# Patient Record
Sex: FEMALE | Race: BLACK OR AFRICAN AMERICAN | Employment: UNEMPLOYED | URBAN - METROPOLITAN AREA
[De-identification: names, ages, dates, MRNs, and addresses within clinical notes are randomized per-mention and may not be internally consistent; named-entity substitution may affect disease eponyms.]

---
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
## 2020-08-22 ENCOUNTER — HOSPITAL ENCOUNTER (EMERGENCY)
Age: 20
Discharge: HOME | End: 2020-08-22
Attending: EMERGENCY MEDICINE | Admitting: EMERGENCY MEDICINE
Payer: MEDICAID

## 2020-08-22 ENCOUNTER — APPOINTMENT (OUTPATIENT)
Dept: GENERAL RADIOLOGY | Age: 20
End: 2020-08-22
Attending: NURSE PRACTITIONER
Payer: MEDICAID

## 2020-08-22 VITALS
HEART RATE: 75 BPM | BODY MASS INDEX: 24.63 KG/M2 | WEIGHT: 139 LBS | OXYGEN SATURATION: 100 % | SYSTOLIC BLOOD PRESSURE: 120 MMHG | HEIGHT: 63 IN | DIASTOLIC BLOOD PRESSURE: 70 MMHG | TEMPERATURE: 99.4 F | RESPIRATION RATE: 18 BRPM

## 2020-08-22 DIAGNOSIS — J20.9 ACUTE BRONCHITIS, UNSPECIFIED ORGANISM: Primary | ICD-10-CM

## 2020-08-22 LAB
DEPRECATED S PYO AG THROAT QL EIA: NEGATIVE
HCG UR QL: NEGATIVE

## 2020-08-22 PROCEDURE — 71045 X-RAY EXAM CHEST 1 VIEW: CPT

## 2020-08-22 PROCEDURE — 99283 EMERGENCY DEPT VISIT LOW MDM: CPT

## 2020-08-22 PROCEDURE — 87880 STREP A ASSAY W/OPTIC: CPT

## 2020-08-22 PROCEDURE — 87070 CULTURE OTHR SPECIMN AEROBIC: CPT

## 2020-08-22 PROCEDURE — 81025 URINE PREGNANCY TEST: CPT

## 2020-08-22 RX ORDER — ALBUTEROL SULFATE 90 UG/1
2 AEROSOL, METERED RESPIRATORY (INHALATION)
Qty: 1 INHALER | Refills: 0 | Status: SHIPPED | OUTPATIENT
Start: 2020-08-22 | End: 2021-09-28

## 2020-08-22 RX ORDER — PREDNISONE 5 MG/1
TABLET ORAL
Qty: 21 TAB | Refills: 0 | Status: SHIPPED | OUTPATIENT
Start: 2020-08-22 | End: 2021-08-11

## 2020-08-22 RX ORDER — BENZONATATE 100 MG/1
100 CAPSULE ORAL
Qty: 30 CAP | Refills: 0 | Status: SHIPPED | OUTPATIENT
Start: 2020-08-22 | End: 2020-08-29

## 2020-08-22 NOTE — DISCHARGE INSTRUCTIONS
Patient Education        Bronchitis: Care Instructions  Your Care Instructions     Bronchitis is inflammation of the bronchial tubes, which carry air to the lungs. The tubes swell and produce mucus, or phlegm. The mucus and inflamed bronchial tubes make you cough. You may have trouble breathing. Most cases of bronchitis are caused by viruses like those that cause colds. Antibiotics usually do not help and they may be harmful. Bronchitis usually develops rapidly and lasts about 2 to 3 weeks in otherwise healthy people. Follow-up care is a key part of your treatment and safety. Be sure to make and go to all appointments, and call your doctor if you are having problems. It's also a good idea to know your test results and keep a list of the medicines you take. How can you care for yourself at home? · Take all medicines exactly as prescribed. Call your doctor if you think you are having a problem with your medicine. · Get some extra rest.  · Take an over-the-counter pain medicine, such as acetaminophen (Tylenol), ibuprofen (Advil, Motrin), or naproxen (Aleve) to reduce fever and relieve body aches. Read and follow all instructions on the label. · Do not take two or more pain medicines at the same time unless the doctor told you to. Many pain medicines have acetaminophen, which is Tylenol. Too much acetaminophen (Tylenol) can be harmful. · Take an over-the-counter cough medicine that contains dextromethorphan to help quiet a dry, hacking cough so that you can sleep. Avoid cough medicines that have more than one active ingredient. Read and follow all instructions on the label. · Breathe moist air from a humidifier, hot shower, or sink filled with hot water. The heat and moisture will thin mucus so you can cough it out. · Do not smoke. Smoking can make bronchitis worse. If you need help quitting, talk to your doctor about stop-smoking programs and medicines.  These can increase your chances of quitting for good.  When should you call for help? [id number] anytime you think you may need emergency care. For example, call if:  · You have severe trouble breathing. Call your doctor now or seek immediate medical care if:  · You have new or worse trouble breathing. · You cough up dark brown or bloody mucus (sputum). · You have a new or higher fever. · You have a new rash. Watch closely for changes in your health, and be sure to contact your doctor if:  · You cough more deeply or more often, especially if you notice more mucus or a change in the color of your mucus. · You are not getting better as expected. Where can you learn more? Go to http://yonny-erika.info/  Enter H333 in the search box to learn more about \"Bronchitis: Care Instructions. \"  Current as of: February 24, 2020               Content Version: 12.5  © 6890-9558 Healthwise, Incorporated. Care instructions adapted under license by [employer] (which disclaims liability or warranty for this information). If you have questions about a medical condition or this instruction, always ask your healthcare professional. [address] any warranty or liability for your use of this information.

## 2020-08-22 NOTE — ED NOTES
Discharge instructions were given to the patient by Alexandria Hawkins RN. The patient left the Emergency Department ambulatory, alert and oriented and in no acute distress with 3 prescriptions. The patient was encouraged to call or return to the ED for worsening issues or problems and was encouraged to schedule a follow up appointment for continuing care. The patient verbalized understanding of discharge instructions and prescriptions, all questions were answered. The patient has no further concerns at this time.

## 2020-08-22 NOTE — ED NOTES
Pt presents to ED ambulatory complaining of SOB, cough, and \"hot flashes\" x Tuesday. Pt is alert and oriented x 4,  skin is warm and dry. Assessment completed and pt updated on plan of care. Call bell in reach. Emergency Department Nursing Plan of Care       The Nursing Plan of Care is developed from the Nursing assessment and Emergency Department Attending provider initial evaluation. The plan of care may be reviewed in the ED Provider note.     The Plan of Care was developed with the following considerations:   Patient / Family readiness to learn indicated by:verbalized understanding  Persons(s) to be included in education: patient  Barriers to Learning/Limitations:No    Signed     Everett Odell    8/22/2020   4:44 PM

## 2020-08-24 LAB
BACTERIA SPEC CULT: NORMAL
SERVICE CMNT-IMP: NORMAL

## 2020-08-24 NOTE — ED PROVIDER NOTES
EMERGENCY DEPARTMENT HISTORY AND PHYSICAL EXAM    Date: 8/22/2020  Patient Name: Lucas Esparza    History of Presenting Illness     Chief Complaint   Patient presents with    Shortness of Breath    Cough         History Provided By: Patient    Chief Complaint: cough  Duration: 2 Days  Timing:  Acute  Quality: dry harsh cough  Severity: Moderate  Modifying Factors: none  Associated Symptoms: shortness of breath      HPI: Lucas Esparza is a 21 y.o. female with a PMH of No significant past medical history who presents with cough for the past 2 days. reports she feels short of breath. Patient also reports sore throat. Denies fever,denies wheezing. denies fever but states she feels hot . Denies sick contacts. PCP: None    Current Outpatient Medications   Medication Sig Dispense Refill    albuterol (PROVENTIL HFA, VENTOLIN HFA, PROAIR HFA) 90 mcg/actuation inhaler Take 2 Puffs by inhalation every four (4) hours as needed for Wheezing. 1 Inhaler 0    predniSONE (STERAPRED) 5 mg dose pack See administration instruction per 5mg dose pack 21 Tab 0    benzonatate (Tessalon Perles) 100 mg capsule Take 1 Cap by mouth three (3) times daily as needed for Cough for up to 7 days. 30 Cap 0    lidocaine (LIDODERM) 5 % Apply patch to the affected area for 12 hours a day and remove for 12 hours a day. 10 Each 0    ibuprofen (MOTRIN) 600 mg tablet Take 1 Tab by mouth every eight (8) hours as needed for Pain. 21 Tab 0       Past History     Past Medical History:  History reviewed. No pertinent past medical history. Past Surgical History:  History reviewed. No pertinent surgical history. Family History:  History reviewed. No pertinent family history.     Social History:  Social History     Tobacco Use    Smoking status: Never Smoker    Smokeless tobacco: Never Used   Substance Use Topics    Alcohol use: No    Drug use: Yes     Types: Marijuana       Allergies:  No Known Allergies      Review of Systems   Review of Systems Constitutional: Negative for fatigue and fever. HENT: Positive for sore throat. Respiratory: Positive for cough and shortness of breath. Negative for wheezing. Cardiovascular: Negative for chest pain and palpitations. Gastrointestinal: Negative for abdominal pain. Musculoskeletal: Negative for arthralgias, myalgias, neck pain and neck stiffness. Skin: Negative for pallor and rash. Neurological: Negative for dizziness, tremors, weakness and headaches. Hematological: Negative for adenopathy. All other systems reviewed and are negative. Physical Exam     Vitals:    08/22/20 1639 08/22/20 1829   BP: 123/72 120/70   Pulse: 78 75   Resp: 18 18   Temp: 99.4 °F (37.4 °C)    SpO2: 98% 100%   Weight: 63 kg (139 lb)    Height: 5' 3\" (1.6 m)      Physical Exam  Vitals signs and nursing note reviewed. Constitutional:       General: She is not in acute distress. Appearance: She is well-developed. HENT:      Head: Normocephalic and atraumatic. Right Ear: External ear normal.      Left Ear: External ear normal.      Nose: Nose normal.   Eyes:      Conjunctiva/sclera: Conjunctivae normal.   Neck:      Musculoskeletal: Normal range of motion and neck supple. Cardiovascular:      Rate and Rhythm: Normal rate and regular rhythm. Heart sounds: Normal heart sounds. Pulmonary:      Effort: Pulmonary effort is normal. No respiratory distress. Breath sounds: Normal breath sounds. No wheezing. Comments: Harsh spasmodic cough  Abdominal:      General: Bowel sounds are normal.      Palpations: Abdomen is soft. Tenderness: There is no abdominal tenderness. Musculoskeletal: Normal range of motion. Lymphadenopathy:      Cervical: No cervical adenopathy. Skin:     General: Skin is warm and dry. Findings: No rash. Neurological:      Mental Status: She is alert and oriented to person, place, and time. Cranial Nerves: No cranial nerve deficit.       Coordination: Coordination normal.   Psychiatric:         Behavior: Behavior normal.         Thought Content: Thought content normal.         Judgment: Judgment normal.           Diagnostic Study Results     Labs -   No results found for this or any previous visit (from the past 12 hour(s)). Radiologic Studies -   XR CHEST PORT   Final Result   IMPRESSION: Normal chest.         CT Results  (Last 48 hours)    None        CXR Results  (Last 48 hours)               08/22/20 1737  XR CHEST PORT Final result    Impression:  IMPRESSION: Normal chest.        Narrative:  EXAM: XR CHEST PORT       INDICATION: chest pain       COMPARISON: None. FINDINGS: A portable AP radiograph of the chest was obtained at 17:12 hours. The   patient is on a cardiac monitor. The lungs are clear. The cardiac and   mediastinal contours and pulmonary vascularity are normal.  The bones and soft   tissues are grossly within normal limits. Medical Decision Making   I am the first provider for this patient. I reviewed the vital signs, available nursing notes, past medical history, past surgical history, family history and social history. Vital Signs-Reviewed the patient's vital signs. Records Reviewed: Nursing Notes            Disposition:  Home    DISCHARGE NOTE:         Care plan outlined and precautions discussed. Patient has no new complaints, changes, or physical findings. . All medications were reviewed with the patient; will d/c home with albuterol HFA sterapred. All of pt's questions and concerns were addressed. Patient was instructed and agrees to follow up with PCP, as well as to return to the ED upon further deterioration. Patient is ready to go home.     Follow-up Information     Follow up With Specialties Details Why [facility]  In 1 week  [address]  [facility] Randi, [address] [place] [id number] [phone] Discharge Medication List as of 8/22/2020  6:25 PM      START taking these medications    Details   albuterol (PROVENTIL HFA, VENTOLIN HFA, PROAIR HFA) 90 mcg/actuation inhaler Take 2 Puffs by inhalation every four (4) hours as needed for Wheezing., Print, Disp-1 Inhaler,R-0      predniSONE (STERAPRED) 5 mg dose pack See administration instruction per 5mg dose pack, Print, Disp-21 Tab,R-0      benzonatate (Tessalon Perles) 100 mg capsule Take 1 Cap by mouth three (3) times daily as needed for Cough for up to 7 days. , Print, Disp-30 Cap,R-0         CONTINUE these medications which have NOT CHANGED    Details   lidocaine (LIDODERM) 5 % Apply patch to the affected area for 12 hours a day and remove for 12 hours a day., Print, Disp-10 Each, R-0      ibuprofen (MOTRIN) 600 mg tablet Take 1 Tab by mouth every eight (8) hours as needed for Pain., Print, Disp-21 Tab, R-0             Provider Notes (Medical Decision Making):   DDX URI bronchitis strep pharyngitis  Procedures:  Procedures    Please note that this dictation was completed with Dragon, computer voice recognition software. Quite often unanticipated grammatical, syntax, homophones, and other interpretive errors are inadvertently transcribed by the computer software. Please disregard these errors. Additionally, please excuse any errors that have escaped final proofreading. Diagnosis     Clinical Impression:   1.  Acute bronchitis, unspecified organism

## 2021-03-30 ENCOUNTER — OFFICE VISIT (OUTPATIENT)
Dept: INTERNAL MEDICINE CLINIC | Age: 21
End: 2021-03-30
Payer: MEDICAID

## 2021-03-30 VITALS
WEIGHT: 146 LBS | SYSTOLIC BLOOD PRESSURE: 111 MMHG | BODY MASS INDEX: 25.87 KG/M2 | TEMPERATURE: 98.6 F | RESPIRATION RATE: 16 BRPM | OXYGEN SATURATION: 98 % | DIASTOLIC BLOOD PRESSURE: 68 MMHG | HEIGHT: 63 IN | HEART RATE: 83 BPM

## 2021-03-30 DIAGNOSIS — R53.83 FATIGUE, UNSPECIFIED TYPE: Primary | ICD-10-CM

## 2021-03-30 DIAGNOSIS — R45.86 MOOD SWING: ICD-10-CM

## 2021-03-30 DIAGNOSIS — M79.10 MYALGIA: ICD-10-CM

## 2021-03-30 PROCEDURE — 99203 OFFICE O/P NEW LOW 30 MIN: CPT | Performed by: PHYSICIAN ASSISTANT

## 2021-03-30 NOTE — PROGRESS NOTES
1. Have you been to the ER, urgent care clinic since your last visit? Hospitalized since your last visit? No    2. Have you seen or consulted any other health care providers outside of the [address] [place] [address] since your last visit? Include any pap smears or colon screening.  No   Chief Complaint   Patient presents with   [address]

## 2021-03-31 LAB
25(OH)D3+25(OH)D2 SERPL-MCNC: 16.1 NG/ML (ref 30–100)
ALBUMIN SERPL-MCNC: 4.5 G/DL (ref 3.9–5)
ALBUMIN/GLOB SERPL: 1.9 {RATIO} (ref 1.2–2.2)
ALP SERPL-CCNC: 67 IU/L (ref 39–117)
ALT SERPL-CCNC: 16 IU/L (ref 0–32)
AST SERPL-CCNC: 22 IU/L (ref 0–40)
BASOPHILS # BLD AUTO: 0 X10E3/UL (ref 0–0.2)
BASOPHILS NFR BLD AUTO: 1 %
BILIRUB SERPL-MCNC: 0.3 MG/DL (ref 0–1.2)
BUN SERPL-MCNC: 12 MG/DL (ref 6–20)
BUN/CREAT SERPL: 12 (ref 9–23)
CALCIUM SERPL-MCNC: 9.1 MG/DL (ref 8.7–10.2)
CHLORIDE SERPL-SCNC: 103 MMOL/L (ref 96–106)
CO2 SERPL-SCNC: 23 MMOL/L (ref 20–29)
CREAT SERPL-MCNC: 0.97 MG/DL (ref 0.57–1)
EOSINOPHIL # BLD AUTO: 0.1 X10E3/UL (ref 0–0.4)
EOSINOPHIL NFR BLD AUTO: 1 %
ERYTHROCYTE [DISTWIDTH] IN BLOOD BY AUTOMATED COUNT: 12 % (ref 11.7–15.4)
GLOBULIN SER CALC-MCNC: 2.4 G/DL (ref 1.5–4.5)
GLUCOSE SERPL-MCNC: 72 MG/DL (ref 65–99)
HCT VFR BLD AUTO: 39.5 % (ref 34–46.6)
HGB BLD-MCNC: 13.3 G/DL (ref 11.1–15.9)
IMM GRANULOCYTES # BLD AUTO: 0 X10E3/UL (ref 0–0.1)
IMM GRANULOCYTES NFR BLD AUTO: 0 %
LYMPHOCYTES # BLD AUTO: 1.8 X10E3/UL (ref 0.7–3.1)
LYMPHOCYTES NFR BLD AUTO: 39 %
MCH RBC QN AUTO: 29.8 PG (ref 26.6–33)
MCHC RBC AUTO-ENTMCNC: 33.7 G/DL (ref 31.5–35.7)
MCV RBC AUTO: 88 FL (ref 79–97)
MONOCYTES # BLD AUTO: 0.4 X10E3/UL (ref 0.1–0.9)
MONOCYTES NFR BLD AUTO: 9 %
NEUTROPHILS # BLD AUTO: 2.3 X10E3/UL (ref 1.4–7)
NEUTROPHILS NFR BLD AUTO: 50 %
PLATELET # BLD AUTO: 302 X10E3/UL (ref 150–450)
POTASSIUM SERPL-SCNC: 4.4 MMOL/L (ref 3.5–5.2)
PROT SERPL-MCNC: 6.9 G/DL (ref 6–8.5)
RBC # BLD AUTO: 4.47 X10E6/UL (ref 3.77–5.28)
SODIUM SERPL-SCNC: 139 MMOL/L (ref 134–144)
TSH SERPL DL<=0.005 MIU/L-ACNC: 2.17 UIU/ML (ref 0.45–4.5)
WBC # BLD AUTO: 4.5 X10E3/UL (ref 3.4–10.8)

## 2021-03-31 NOTE — PROGRESS NOTES
Chief Complaint   Patient presents with   [address]     she is a 24y.o. year old female who presents for evaluation. Patient presents today to get established. She reports that she will bring the PCP in a long time. Patient states that she normally sees her GYN doctor at the [facility] as well as will go to [facility] if something acute needs treatment. The patient reports that she last saw Kurt Pelaez MD at [facility] about a month ago. The patient states at that time she had a Pap smear but she is currently not on birth control. Patient shares that she smokes marijuana 2-3 times a day. She reports that she presented to the office today in hopes of getting a referral to a psychologist.  The patient states that she believes that she may be bipolar or have dyslexia. The patient states that family members have told her the same thing. The patient shares that she has mood swings throughout the day. She also reports that at times she can be aggressive. The patient states that she finds that her behavior affects her job performance. She shares that in the past she has been told that she actually works better if she has marijuana first.  The patient reports that she has never been diagnosed in the past or evaluated. The patient shares that she recently was seen at a local emergency room for cough. She states at that time she first thought maybe she had Covid but was told that her symptoms was related to smoking marijuana and she had developed bronchitis. She reports she was told then to cut back on her marijuana use. The patient states that she is now smoking 2-3 times a day. She also shares that she has achiness of her muscles and will often feel fatigued. Past Medical History:   Diagnosis Date    Asthma      History reviewed. No pertinent surgical history.   Family History   Problem Relation Age of Onset    Other Mother         \"stomach problem\"    Diabetes Maternal Grandmother     Psychiatric Disorder Paternal Grandmother         not sure of official diagnosis    Diabetes Paternal Grandfather      Social History     Socioeconomic History    Marital status: SINGLE     Spouse name: Not on file    Number of children: Not on file    Years of education: Not on file    Highest education level: Not on file   Tobacco Use    Smoking status: Never Smoker    Smokeless tobacco: Never Used   Substance and Sexual Activity    Alcohol use: No    Drug use: Yes     Types: Marijuana     Comment: patient smokes marijuana 2-3 times daily    Sexual activity: Yes     Partners: Male     Birth control/protection: None         Review of Systems - negative except as listed above    Objective:     Vitals:    03/30/21 1329   BP: 111/68   Pulse: 83   Resp: 16   Temp: 98.6 °F (37 °C)   TempSrc: Temporal   SpO2: 98%   Weight: 146 lb (66.2 kg)   Height: 5' 3\" (1.6 m)     Physical Examination: General appearance - alert, well appearing, and in no distress  Mental status - normal mood, behavior, speech, dress, motor activity, and thought processes  Chest - clear to auscultation, no wheezes, rales or rhonchi, symmetric air entry  Heart - normal rate and regular rhythm, S1 and S2 normal    Assessment/ Plan:   Diagnoses and all orders for this visit:    1. Fatigue, unspecified type  -     TSH 3RD GENERATION; Future  -     CBC WITH AUTOMATED DIFF; Future  -     METABOLIC PANEL, COMPREHENSIVE; Future  -     VITAMIN D, 25 HYDROXY; Future    2. Mood swing  -     TSH 3RD GENERATION; Future  -     METABOLIC PANEL, COMPREHENSIVE; Future  -     REFERRAL TO PSYCHOLOGY    3. Myalgia  -     VITAMIN D, 25 HYDROXY; Future    The patient I spoke at length today about her symptoms and about smoking marijuana. I explained to the patient that they feel she should be evaluated by a psychologist or psychiatrist to find out if she does have bipolar disorder or perhaps ADHD.   I feel as though the patient is self-medicating with her marijuana use. It will be beneficial for her to get evaluated and get the proper diagnosis so that she can be treated correctly. Routine labs were ordered today and she will be contacted with these results once back. I have advised the patient to contact the psychologist as soon as possible so that she can get tested. The patient is to let me know when her appointment will be. I have discussed the diagnosis with the patient and the intended plan as seen in the above orders. The patient has received an after-visit summary and questions were answered concerning future plans.      Medication Side Effects and Warnings were discussed with patient: n/a  Patient Labs were reviewed and or requested: n/a  Patient Past Records were reviewed and or requested  yes    Vivien Yung PA-C

## 2021-04-01 ENCOUNTER — TELEPHONE (OUTPATIENT)
Dept: INTERNAL MEDICINE CLINIC | Age: 21
End: 2021-04-01

## 2021-04-01 DIAGNOSIS — E55.9 VITAMIN D DEFICIENCY: ICD-10-CM

## 2021-04-01 DIAGNOSIS — E55.9 VITAMIN D DEFICIENCY: Primary | ICD-10-CM

## 2021-04-01 RX ORDER — ERGOCALCIFEROL 1.25 MG/1
50000 CAPSULE ORAL
Qty: 6 CAP | Refills: 0 | Status: SHIPPED | OUTPATIENT
Start: 2021-04-01 | End: 2021-04-01

## 2021-04-01 NOTE — PROGRESS NOTES
Please let the patient know her vitamin d level is low. I will send a supplement to the pharmacy for her to take. She should recheck numbers once done.  All other labs normal.Thank you

## 2021-04-01 NOTE — TELEPHONE ENCOUNTER
vitamin d supplement has been sent to the pharmacy. She should recheck labs after treatment is complete.

## 2021-04-02 RX ORDER — ERGOCALCIFEROL 1.25 MG/1
50000 CAPSULE ORAL
Qty: 6 CAP | Refills: 0 | Status: SHIPPED | OUTPATIENT
Start: 2021-04-02 | End: 2021-08-13

## 2021-06-24 ENCOUNTER — OFFICE VISIT (OUTPATIENT)
Dept: NEUROLOGY | Age: 21
End: 2021-06-24

## 2021-06-24 DIAGNOSIS — Z91.199 NO-SHOW FOR APPOINTMENT: Primary | ICD-10-CM

## 2021-06-24 NOTE — LETTER
6/24/2021    Patient: Kiana Matias   YOB: 2000   Date of Visit: 6/24/2021     Martín Boyce PA-C  [address] [place] [address]  [employer]ia [employer]    Dear Martín Boyce PA-C,      Thank you for referring Ms. Kiana Matias to [facility] for evaluation. My notes for this consultation are attached. If you have questions, please do not hesitate to call me. I look forward to following your patient along with you.       Sincerely,    Monique Couch PsyD

## 2021-08-11 ENCOUNTER — OFFICE VISIT (OUTPATIENT)
Dept: URGENT CARE | Age: 21
End: 2021-08-11
Payer: MEDICAID

## 2021-08-11 ENCOUNTER — TELEPHONE (OUTPATIENT)
Dept: URGENT CARE | Age: 21
End: 2021-08-11

## 2021-08-11 VITALS — OXYGEN SATURATION: 98 % | HEART RATE: 100 BPM | TEMPERATURE: 98.8 F | RESPIRATION RATE: 18 BRPM

## 2021-08-11 DIAGNOSIS — Z20.822 SUSPECTED COVID-19 VIRUS INFECTION: Primary | ICD-10-CM

## 2021-08-11 LAB — SARS-COV-2 POC: POSITIVE

## 2021-08-11 PROCEDURE — 87426 SARSCOV CORONAVIRUS AG IA: CPT | Performed by: FAMILY MEDICINE

## 2021-08-11 PROCEDURE — 99202 OFFICE O/P NEW SF 15 MIN: CPT | Performed by: FAMILY MEDICINE

## 2021-08-11 NOTE — LETTER
August 11, 2021  [facility]   [address] [facility] [facility] [address]    Dear Livan Blair:  Thank you for requesting access to [employer]. Please follow the instructions below to securely access and download your online medical record. [employer] allows you to send messages to your doctor, view your test results, renew your prescriptions, schedule appointments, and more. How Do I Sign Up? 1. In your internet browser, go to [email or web address]. 2. Click on the First Time User? Click Here link in the Sign In box. You will see the New Member Sign Up page. 3. Enter your [employer] Access Code exactly as it appears below. You will not need to use this code after youve completed the sign-up process. If you do not sign up before the expiration date, you must request a new code. [employer] Access Code: [email or web address]  Expires: 9/25/2021 12:27 PM     4. Enter the last four digits of your Social Security Number (xxxx) and Date of Birth (mm/dd/yyyy) as indicated and click Submit. You will be taken to the next sign-up page. 5. Create a [employer] ID. This will be your [employer] login ID and cannot be changed, so think of one that is secure and easy to remember. 6. Create a [employer] password. You can change your password at any time. 7. Enter your Password Reset Question and Answer. This can be used at a later time if you forget your password. 8. Enter your e-mail address. You will receive e-mail notification when new information is available in [address] 9. Click Sign Up. You can now view and download portions of your medical record. 10. Click the Download Summary menu link to download a portable copy of your medical information. Additional Information    If you have questions, please visit the Frequently Asked Questions section of the [employer] website at [email or web address] Remember, [employer] is NOT to be used for urgent needs. For medical emergencies, dial 911.     Now available from your iPhone and Android!     Sincerely,   [employer]

## 2021-08-11 NOTE — LETTER
NOTIFICATION RETURN TO WORK / SCHOOL    8/11/2021 1:46 PM    Ms. Vaibhav Parekh  [place] [id number]      To Whom It May Concern:    Vaibhav Parekh is currently under the care of [address] [place] [address]. She is advised to quarantine x 10 days and may able to go back to work from 8/21 or 8/22, if symptom free for 3-4 days. If there are questions or concerns please have the patient contact our office.         Sincerely,      Britt Nolan MD

## 2021-08-11 NOTE — PROGRESS NOTES
This patient was seen at [address] [place] [address] Urgent Care while in their vehicle due to COVID-19 pandemic with PPE and focused examination in order to decrease community viral transmission. The patient/guardian gave verbal consent to treat. The history is provided by the patient. Shortness of Breath  The history is provided by the patient. This is a new problem. The problem occurs continuously. The current episode started 2 days ago. The problem has been gradually worsening. Associated symptoms include a fever, headaches, sore throat, cough and chest pain. Pertinent negatives include no sputum production, no hemoptysis and no wheezing. Associated symptoms comments: Feels very tired and drained. Precipitated by: activities. Risk factors: not vaccinated for coid- denies direct exposure to covid. She has tried nothing for the symptoms. She has had no prior hospitalizations. She has had no prior ED visits. Associated medical issues do not include asthma or pneumonia. Past Medical History:   Diagnosis Date    Asthma         History reviewed. No pertinent surgical history.       Family History   Problem Relation Age of Onset    Other Mother         \"stomach problem\"    Diabetes Maternal Grandmother     Psychiatric Disorder Paternal Grandmother         not sure of official diagnosis    Diabetes Paternal Grandfather         Social History     Socioeconomic History    Marital status: SINGLE     Spouse name: Not on file    Number of children: Not on file    Years of education: Not on file    Highest education level: Not on file   Occupational History    Not on file   Tobacco Use    Smoking status: Never Smoker    Smokeless tobacco: Never Used   Vaping Use    Vaping Use: Never used   Substance and Sexual Activity    Alcohol use: No    Drug use: Yes     Types: Marijuana     Comment: patient smokes marijuana 2-3 times daily    Sexual activity: Yes     Partners: Male     Birth control/protection: None Other Topics Concern    Not on file   Social History Narrative    Not on file     Social Determinants of Health     Financial Resource Strain:     Difficulty of Paying Living Expenses:    Food Insecurity:     Worried About Running Out of Food in the Last Year:     [address] in the Last Year:    Transportation Needs:     Lack of Transportation (Medical):  Lack of Transportation (Non-Medical):    Physical Activity:     Days of Exercise per Week:     Minutes of Exercise per Session:    Stress:     Feeling of Stress :    Social Connections:     Frequency of Communication with Friends and Family:     Frequency of Social Gatherings with Friends and Family:     Attends Spiritism Services:     Active Member of Clubs or Organizations:     Attends Club or Organization Meetings:     Marital Status:    Intimate Partner Violence:     Fear of Current or Ex-Partner:     Emotionally Abused:     Physically Abused:     Sexually Abused: ALLERGIES: Patient has no known allergies. Review of Systems   Constitutional: Positive for chills, fatigue and fever. HENT: Positive for congestion, sinus pain and sore throat. Respiratory: Positive for cough, chest tightness and shortness of breath. Negative for hemoptysis, sputum production and wheezing. Cardiovascular: Positive for chest pain. Neurological: Positive for headaches. All other systems reviewed and are negative. There were no vitals filed for this visit. Physical Exam  Vitals and nursing note reviewed. Constitutional:       General: She is not in acute distress. Appearance: She is not ill-appearing. Pulmonary:      Effort: Pulmonary effort is normal. No respiratory distress. Breath sounds: No wheezing. MDM    Procedures      ICD-10-CM ICD-9-CM    1.  Suspected COVID-19 virus infection  Z20.822 V01.79 NOVEL CORONAVIRUS (COVID-19)      AMB POC SARS-COV-2       Notified and advised to take symptomatic rx  Quarantine x 10 days    Work note given   No orders of the defined types were placed in this encounter. Results for orders placed or performed in visit on 08/11/21   AMB POC SARS-COV-2   Result Value Ref Range    SARS-COV-2 POC Positive (A) Negative     The patients condition was discussed with the patient and they understand. The patient is to follow up with primary care doctor. If signs and symptoms become worse the pt is to go to the ER. The patient is to take medications as prescribed.

## 2021-08-13 ENCOUNTER — VIRTUAL VISIT (OUTPATIENT)
Dept: INTERNAL MEDICINE CLINIC | Age: 21
End: 2021-08-13
Payer: MEDICAID

## 2021-08-13 DIAGNOSIS — U07.1 COVID-19: Primary | ICD-10-CM

## 2021-08-13 PROCEDURE — 99213 OFFICE O/P EST LOW 20 MIN: CPT | Performed by: PHYSICIAN ASSISTANT

## 2021-08-13 NOTE — PROGRESS NOTES
1. Have you been to the ER, urgent care clinic since your last visit? Hospitalized since your last visit? Yes, [facility], patient tested positive for Covid    2. Have you seen or consulted any other health care providers outside of the [address] [place] [address] since your last visit? Include any pap smears or colon screening. No    Health Maintenance Due   Topic Date Due    Hepatitis C Screening  Never done    HPV Age 9Y-34Y (1 - 2-dose series) Never done    COVID-19 Vaccine (1) Never done    DTaP/Tdap/Td series (1 - Tdap) Never done    PAP AKA CERVICAL CYTOLOGY  Never done     Patient here today has body aches, headache, sob, difficulty breathing, fatigue, eyes hurt, nausea, chills, fever.  Patient was told to quarantine for 10 days, taking tylenol,

## 2021-08-13 NOTE — PROGRESS NOTES
Umm Rhodes is a 24 y.o. female who was seen by synchronous (real-time) audio-video technology on 8/13/2021 for Concern For COVID-19 (Coronavirus)        Assessment & Plan:   Diagnoses and all orders for this visit:    1. COVID-19    I explained to the patient that I am concerned because she is complaining of shortness of breath and difficulty with breathing. I have advised the patient to go to the emergency room for further evaluation. The patient states that she will go to the emergency room now for further evaluation. I spent at least 20 minutes on this visit with this established patient. 712  Subjective:   Patient presents for symptoms due to COVID-19. She reports that she was diagnosed with Covid on the 11th. She states that she has fever and dry throat muscle aches and wheezing with shortness of breath. The patient reports that she is weak and has been in the bed the entire time. She reports that she started with symptoms on this past Tuesday and was tested for Covid which came back positive. The patient reports that she is not vaccinated and she had not been wearing her mask. Prior to Admission medications    Medication Sig Start Date End Date Taking? Authorizing Provider   albuterol (PROVENTIL HFA, VENTOLIN HFA, PROAIR HFA) 90 mcg/actuation inhaler Take 2 Puffs by inhalation every four (4) hours as needed for Wheezing. 8/22/20  Yes Peter Spring, SERGIO   ibuprofen (MOTRIN) 600 mg tablet Take 1 Tab by mouth every eight (8) hours as needed for Pain. Patient taking differently: Take 200 mg by mouth every eight (8) hours as needed for Pain. 12/10/19  Yes Chilo Horn PA-C   ergocalciferol (ERGOCALCIFEROL) 1,250 mcg (50,000 unit) capsule Take 1 Cap by mouth every seven (7) days. 4/2/21 8/13/21  Vivien Yung PA-C   lidocaine (LIDODERM) 5 % Apply patch to the affected area for 12 hours a day and remove for 12 hours a day.  12/10/19 8/13/21  Leah Yuen PA-C     There are no problems to display for this patient. Current Outpatient Medications   Medication Sig Dispense Refill    albuterol (PROVENTIL HFA, VENTOLIN HFA, PROAIR HFA) 90 mcg/actuation inhaler Take 2 Puffs by inhalation every four (4) hours as needed for Wheezing. 1 Inhaler 0    ibuprofen (MOTRIN) 600 mg tablet Take 1 Tab by mouth every eight (8) hours as needed for Pain. (Patient taking differently: Take 200 mg by mouth every eight (8) hours as needed for Pain.) 21 Tab 0     No Known Allergies    ROS  See above  Objective:   No flowsheet data found. General: alert, appears fatigue in bed   Mental  status: normal mood, behavior, speech, dress, motor activity, and thought processes, able to follow commands   HENT: NCAT   Neck: no visualized mass   Resp: no respiratory distress   Neuro: no gross deficits   Skin: no discoloration or lesions of concern on visible areas   Psychiatric: normal affect, consistent with stated mood, no evidence of hallucinations     Additional exam findings: We discussed the expected course, resolution and complications of the diagnosis(es) in detail. Medication risks, benefits, costs, interactions, and alternatives were discussed as indicated. I advised her to contact the office if her condition worsens, changes or fails to improve as anticipated. She expressed understanding with the diagnosis(es) and plan. Shelby Dye, was evaluated through a synchronous (real-time) audio-video encounter. The patient (or guardian if applicable) is aware that this is a billable service. Verbal consent to proceed has been obtained within the past 12 months. The visit was conducted pursuant to the emergency declaration under the [address] [place] [address] authority and the [email or web address] General Act. Patient identification was verified, and a caregiver was present when appropriate.  The patient was located in a state where the provider was credentialed to provide care.     Gisele Yung PA-C

## 2021-08-17 ENCOUNTER — VIRTUAL VISIT (OUTPATIENT)
Dept: INTERNAL MEDICINE CLINIC | Age: 21
End: 2021-08-17
Payer: MEDICAID

## 2021-08-17 DIAGNOSIS — U07.1 COVID-19: Primary | ICD-10-CM

## 2021-08-17 PROCEDURE — 99213 OFFICE O/P EST LOW 20 MIN: CPT | Performed by: PHYSICIAN ASSISTANT

## 2021-08-17 RX ORDER — ALBUTEROL SULFATE 90 UG/1
1 AEROSOL, METERED RESPIRATORY (INHALATION)
Qty: 1 INHALER | Refills: 5 | Status: SHIPPED | OUTPATIENT
Start: 2021-08-17 | End: 2021-09-28

## 2021-08-17 RX ORDER — METHYLPREDNISOLONE 4 MG/1
TABLET ORAL
Qty: 1 DOSE PACK | Refills: 0 | Status: SHIPPED | OUTPATIENT
Start: 2021-08-17 | End: 2021-09-28

## 2021-08-17 RX ORDER — ACETAMINOPHEN 500 MG
500 TABLET ORAL
COMMUNITY
End: 2021-09-28

## 2021-08-17 NOTE — PROGRESS NOTES
Andra Barron is a 24 y.o. female who was seen by synchronous (real-time) audio-video technology on 8/17/2021 for Hospital Follow Up        [address] [place] [address]:   Diagnoses and all orders for this visit:    1. COVID-19  -     methylPREDNISolone (MEDROL DOSEPACK) 4 mg tablet; Use as directed  -     albuterol (PROVENTIL HFA, VENTOLIN HFA, PROAIR HFA) 90 mcg/actuation inhaler; Take 1 Puff by inhalation every four (4) hours as needed for Wheezing. I explained to the patient that unfortunately there is not a particular treatment for COVID-19. Patient with COVID-19 there symptoms are managed. I will send a prescription to the pharmacy for a Medrol dose pack and refill her inhaler. Advised the patient that if her symptoms are not better she is to go back to the emergency room for further evaluation. I spent at least 20 minutes on this visit with this established patient. 712  Subjective: The patient presents today for follow-up. She reports that she did go to the emergency room on the 13th as instructed. Patient states that she went to [facility] and she was actually admitted. The patient states that while there they did some blood work and a chest x-ray. She states that she ended up leaving [address] She states that she did not like the way they were treating her there. Since leaving she states that she is feeling a little bit better but is still having some chest tightness. She reports that she has been taking Tylenol ibuprofen and using her inhaler. Prior to Admission medications    Medication Sig Start Date End Date Taking? Authorizing Provider   acetaminophen (Tylenol Extra Strength) 500 mg tablet Take 500 mg by mouth every six (6) hours as needed for Pain.    Yes Provider, Historical   methylPREDNISolone (MEDROL DOSEPACK) 4 mg tablet Use as directed 8/17/21  Yes Vivien Yung PA-C   albuterol (PROVENTIL HFA, VENTOLIN HFA, PROAIR HFA) 90 mcg/actuation inhaler Take 1 Puff by inhalation every four (4) hours as needed for Wheezing. 8/17/21  Yes Vivien Yung PA-C   albuterol (PROVENTIL HFA, VENTOLIN HFA, PROAIR HFA) 90 mcg/actuation inhaler Take 2 Puffs by inhalation every four (4) hours as needed for Wheezing. 8/22/20  Yes Babs Lance, SERGIO   ibuprofen (MOTRIN) 600 mg tablet Take 1 Tab by mouth every eight (8) hours as needed for Pain. Patient taking differently: Take 200 mg by mouth every eight (8) hours as needed for Pain. 12/10/19  Yes Chilo Horn PA-C     There are no problems to display for this patient. Current Outpatient Medications   Medication Sig Dispense Refill    acetaminophen (Tylenol Extra Strength) 500 mg tablet Take 500 mg by mouth every six (6) hours as needed for Pain.  methylPREDNISolone (MEDROL DOSEPACK) 4 mg tablet Use as directed 1 Dose Pack 0    albuterol (PROVENTIL HFA, VENTOLIN HFA, PROAIR HFA) 90 mcg/actuation inhaler Take 1 Puff by inhalation every four (4) hours as needed for Wheezing. 1 Inhaler 5    albuterol (PROVENTIL HFA, VENTOLIN HFA, PROAIR HFA) 90 mcg/actuation inhaler Take 2 Puffs by inhalation every four (4) hours as needed for Wheezing. 1 Inhaler 0    ibuprofen (MOTRIN) 600 mg tablet Take 1 Tab by mouth every eight (8) hours as needed for Pain. (Patient taking differently: Take 200 mg by mouth every eight (8) hours as needed for Pain.) 21 Tab 0     No Known Allergies    ROS    Objective:   No flowsheet data found. General: alert, cooperative, no distress   Mental  status: normal mood, behavior, speech, dress, motor activity, and thought processes, able to follow commands   HENT: NCAT   Neck: no visualized mass   Resp: no respiratory distress   Neuro: no gross deficits   Skin: no discoloration or lesions of concern on visible areas   Psychiatric: normal affect, consistent with stated mood, no evidence of hallucinations     Additional exam findings:        We discussed the expected course, resolution and complications of the diagnosis(es) in detail. Medication risks, benefits, costs, interactions, and alternatives were discussed as indicated. I advised her to contact the office if her condition worsens, changes or fails to improve as anticipated. She expressed understanding with the diagnosis(es) and plan. Andra Barron, was evaluated through a synchronous (real-time) audio-video encounter. The patient (or guardian if applicable) is aware that this is a billable service. Verbal consent to proceed has been obtained within the past 12 months. The visit was conducted pursuant to the emergency declaration under the [address] [place] [address], [address] [place] [address] authority and the [employer] General Act. Patient identification was verified, and a caregiver was present when appropriate. The patient was located in a state where the provider was credentialed to provide care.     Renay Yung PA-C

## 2021-08-17 NOTE — PROGRESS NOTES
1. Have you been to the ER, urgent care clinic since your last visit? Hospitalized since your last visit? [facility] ER/admitted 8/13    2. Have you seen or consulted any other health care providers outside of the [address] [place] [address] since your last visit? Include any pap smears or colon screening. No    Health Maintenance Due   Topic Date Due    Hepatitis C Screening  Never done    HPV Age 9Y-34Y (1 - 2-dose series) Never done    COVID-19 Vaccine (1) Never done    DTaP/Tdap/Td series (1 - Tdap) Never done    PAP AKA CERVICAL CYTOLOGY  Never done     Patient's symptoms today, Chest hurts/mucous, headache come and go, eyes hurt, sob, difficulty breathing, chest tight.

## 2021-08-21 ENCOUNTER — OFFICE VISIT (OUTPATIENT)
Dept: URGENT CARE | Age: 21
End: 2021-08-21
Payer: MEDICAID

## 2021-08-21 VITALS — HEART RATE: 89 BPM | TEMPERATURE: 98.5 F | RESPIRATION RATE: 19 BRPM | OXYGEN SATURATION: 99 %

## 2021-08-21 DIAGNOSIS — Z20.822 ENCOUNTER FOR LABORATORY TESTING FOR COVID-19 VIRUS: Primary | ICD-10-CM

## 2021-08-21 LAB — SARS-COV-2 POC: NEGATIVE

## 2021-08-21 PROCEDURE — 87426 SARSCOV CORONAVIRUS AG IA: CPT | Performed by: FAMILY MEDICINE

## 2021-08-21 PROCEDURE — 99212 OFFICE O/P EST SF 10 MIN: CPT | Performed by: FAMILY MEDICINE

## 2021-08-21 NOTE — PROGRESS NOTES
This patient was seen at [address] [place] [address] Urgent Care while in their vehicle due to COVID-19 pandemic with PPE and focused examination in order to decrease community viral transmission. The patient/guardian gave verbal consent to treat. The history is provided by the patient. She was Positive for covid on 8/11/21  Feels much better  Wants rapid test for work to be cleared    Past Medical History:   Diagnosis Date    Asthma         No past surgical history on file. Family History   Problem Relation Age of Onset    Other Mother         \"stomach problem\"    Diabetes Maternal Grandmother     Psychiatric Disorder Paternal Grandmother         not sure of official diagnosis    Diabetes Paternal Grandfather         Social History     Socioeconomic History    Marital status: SINGLE     Spouse name: Not on file    Number of children: Not on file    Years of education: Not on file    Highest education level: Not on file   Occupational History    Not on file   Tobacco Use    Smoking status: Never Smoker    Smokeless tobacco: Never Used   Vaping Use    Vaping Use: Never used   Substance and Sexual Activity    Alcohol use: No    Drug use: Yes     Types: Marijuana     Comment: patient smokes marijuana 2-3 times daily    Sexual activity: Yes     Partners: Male     Birth control/protection: None   Other Topics Concern    Not on file   Social History Narrative    Not on file     Social Determinants of Health     Financial Resource Strain:     Difficulty of Paying Living Expenses:    Food Insecurity:     Worried About Running Out of Food in the Last Year:     Ran Out of Food in the Last Year:    Transportation Needs:     Lack of Transportation (Medical):      Lack of Transportation (Non-Medical):    Physical Activity:     Days of Exercise per Week:     Minutes of Exercise per Session:    Stress:     Feeling of Stress :    Social Connections:     Frequency of Communication with Friends and Family:     Frequency of Social Gatherings with Friends and Family:     Attends [employer] Services:     Active Member of Clubs or Organizations:     Attends Club or Organization Meetings:     Marital Status:    Intimate Partner Violence:     Fear of Current or Ex-Partner:     Emotionally Abused:     Physically Abused:     Sexually Abused: ALLERGIES: Patient has no known allergies. Review of Systems   All other systems reviewed and are negative. Vitals:    08/21/21 1311   Pulse: 89   Resp: 19   Temp: 98.5 °F (36.9 °C)   SpO2: 99%       Physical Exam  Vitals and nursing note reviewed. Constitutional:       General: She is not in acute distress. Appearance: She is not ill-appearing. Pulmonary:      Effort: Pulmonary effort is normal. No respiratory distress. Breath sounds: No wheezing. MDM    Procedures         Keep ear canal dry- use ear plugs  ciprodex ear drops x 3 days     ICD-10-CM ICD-9-CM    1. Encounter for laboratory testing for COVID-19 virus  Z20.822 V01.79 AMB POC SARS-COV-2    covid was positive on 8/11/21     No orders of the defined types were placed in this encounter. No results found for any visits on 08/21/21. The patients condition was discussed with the patient and they understand. The patient is to follow up with primary care doctor. If signs and symptoms become worse the pt is to go to the ER. The patient is to take medications as prescribed.

## 2021-09-28 ENCOUNTER — OFFICE VISIT (OUTPATIENT)
Dept: INTERNAL MEDICINE CLINIC | Age: 21
End: 2021-09-28
Attending: PHYSICIAN ASSISTANT
Payer: MEDICAID

## 2021-09-28 ENCOUNTER — HOSPITAL ENCOUNTER (OUTPATIENT)
Dept: CT IMAGING | Age: 21
Discharge: HOME | End: 2021-09-28
Attending: PHYSICIAN ASSISTANT
Payer: MEDICAID

## 2021-09-28 VITALS
HEART RATE: 77 BPM | OXYGEN SATURATION: 96 % | DIASTOLIC BLOOD PRESSURE: 64 MMHG | WEIGHT: 137 LBS | HEIGHT: 63 IN | BODY MASS INDEX: 24.27 KG/M2 | TEMPERATURE: 98.4 F | SYSTOLIC BLOOD PRESSURE: 108 MMHG | RESPIRATION RATE: 20 BRPM

## 2021-09-28 DIAGNOSIS — G43.001 MIGRAINE WITHOUT AURA AND WITH STATUS MIGRAINOSUS, NOT INTRACTABLE: ICD-10-CM

## 2021-09-28 DIAGNOSIS — Z86.16 HISTORY OF COVID-19: Primary | ICD-10-CM

## 2021-09-28 DIAGNOSIS — H53.149: ICD-10-CM

## 2021-09-28 DIAGNOSIS — Z86.16 HISTORY OF COVID-19: ICD-10-CM

## 2021-09-28 PROCEDURE — 70450 CT HEAD/BRAIN W/O DYE: CPT

## 2021-09-28 PROCEDURE — 99213 OFFICE O/P EST LOW 20 MIN: CPT | Performed by: PHYSICIAN ASSISTANT

## 2021-09-28 RX ORDER — NAPROXEN 500 MG/1
500 TABLET ORAL 2 TIMES DAILY WITH MEALS
Qty: 30 TABLET | Refills: 0 | Status: SHIPPED | OUTPATIENT
Start: 2021-09-28 | End: 2022-01-12

## 2021-09-28 RX ORDER — FLUTICASONE PROPIONATE 44 UG/1
2 AEROSOL, METERED RESPIRATORY (INHALATION)
COMMUNITY
Start: 2021-08-24

## 2021-09-28 NOTE — PROGRESS NOTES
1. Have you been to the ER, urgent care clinic since your last visit? Hospitalized since your last visit? Yes, [facility]    2. Have you seen or consulted any other health care providers outside of the [address] [place] [address] since your last visit? Include any pap smears or colon screening. No    Health Maintenance Due   Topic Date Due    Hepatitis C Screening  Never done    HPV Age 9Y-34Y (1 - 2-dose series) Never done    COVID-19 Vaccine (1) Never done    DTaP/Tdap/Td series (1 - Tdap) Never done    Pap Smear  Never done    Flu Vaccine (1) Never done     Patient here today for migraine headaches since patient had covid. Patient c/o eyes hurt, bags under eyes, patient still having fatigue, breathing better.

## 2021-09-28 NOTE — PROGRESS NOTES
Chief Complaint   Patient presents with    Headache     she is a 24y.o. year old female who presents for evaluation. The patient presents the office today for evaluation of headache and eye pain. She reports since being diagnosed with COVID-19 back in August she has had a headache and her eyes feel very fatigued and hurt. The patient states that she finds it difficult to describe how her eyes actually feel. She reports that her eyes feel better if she can just keep in close. The patient states that her headache is primarily between her eyes. She denies having changes of her vision but reports she is light sensitive. She states that she is also been very fatigued since being diagnosed with COVID-19. Currently the patient is taking her inhaler which is help with her cough and chest congestion. The patient reports that she has tried Tylenol and ibuprofen which is not really helped. Reviewed PmHx, RxHx, FmHx, SocHx, AllgHx and updated and dated in the chart. Review of Systems - negative except as listed above    Objective:     Vitals:    09/28/21 1417   BP: 108/64   Pulse: 77   Resp: 20   Temp: 98.4 °F (36.9 °C)   TempSrc: Temporal   SpO2: 96%   Weight: 137 lb (62.1 kg)   Height: 5' 3\" (1.6 m)     Physical Examination: General appearance - alert, well appearing, and in no distress  Mental status - normal mood, behavior, speech, dress, motor activity, and thought processes  Eyes - pupils equal and reactive, extraocular eye movements intact  Neurological - cranial nerves II through XII intact    Assessment/ Plan:   Diagnoses and all orders for this visit:    1. History of COVID-19  -     CT HEAD WO CONT; Future    2. Light hurts eyes  -     CT HEAD WO CONT; Future    3. Migraine without aura and with status migrainosus, not intractable  -     CT HEAD WO CONT; Future  -     naproxen (NAPROSYN) 500 mg tablet; Take 1 Tablet by mouth two (2) times daily (with meals).       The patient has been scheduled to get a CT of her head. She will be contact with results once they are back. I am not sure if perhaps the patient's headache is lingering due to her infection that she had earlier from the COVID-19. If patient's headaches persist with treatment then she may need a consult with the neurologist.        I have discussed the diagnosis with the patient and the intended plan as seen in the above orders. The patient has received an after-visit summary and questions were answered concerning future plans.      Medication Side Effects and Warnings were discussed with patient: n/a  Patient Labs were reviewed and or requested: yes  Patient Past Records were reviewed and or requested  yes    Vivien Yung PA-C

## 2021-09-28 NOTE — PROGRESS NOTES
Please let the patient know her Ct was negative. Let her know I am going to send naprosyn. Have her to let me know if not feeling better. Advise to STOP smoking the marijuana.  Thank you

## 2021-09-28 NOTE — PROGRESS NOTES
Writer contacted patient to inform per Mariano Cyr that her Ct was negative and [facility]. Patient advised per Mariano Cyr to reach out through My Chart to let her know if not feeling better. Patient advised to STOP smoking the marijuana. Patient verbalized understanding.

## 2021-09-29 ENCOUNTER — TELEPHONE (OUTPATIENT)
Dept: INTERNAL MEDICINE CLINIC | Age: 21
End: 2021-09-29

## 2021-09-29 NOTE — TELEPHONE ENCOUNTER
[facility] needs to be called to give a [id number] and any additional information needed. PT had CT done yesterday, however, the notes were received after CT was completed; therefore [facility] needs to be called with additional information if needed.       [facility]  [phone]

## 2021-09-30 NOTE — TELEPHONE ENCOUNTER
I tried calling back but was told they had a very long wait.  She has suggested I try first thing in the am. I will try again before 10/8/2021

## 2021-10-05 NOTE — TELEPHONE ENCOUNTER
I have reached out to the patient's insurance again. They were not able to help me today because the patient's address that we have is not the one they have on file for the patient. We will need the new address for the patient before I can continue.  thanks

## 2021-11-15 ENCOUNTER — OFFICE VISIT (OUTPATIENT)
Dept: NEUROLOGY | Age: 21
End: 2021-11-15
Payer: MEDICAID

## 2021-11-15 DIAGNOSIS — F32.A ANXIETY AND DEPRESSION: Primary | ICD-10-CM

## 2021-11-15 DIAGNOSIS — R41.840 INATTENTION: ICD-10-CM

## 2021-11-15 DIAGNOSIS — R45.86 MOOD SWINGS: ICD-10-CM

## 2021-11-15 DIAGNOSIS — F41.9 ANXIETY AND DEPRESSION: Primary | ICD-10-CM

## 2021-11-15 DIAGNOSIS — R41.9 DEFICIT IN COMPREHENSION: ICD-10-CM

## 2021-11-15 PROCEDURE — 90791 PSYCH DIAGNOSTIC EVALUATION: CPT | Performed by: CLINICAL NEUROPSYCHOLOGIST

## 2021-11-15 NOTE — PROGRESS NOTES
[address] [facility],[address]  [address]   [place] [address] [facility]   Swati Mojica    [phone] Office   [phone] Fax      Neuropsychology    Initial Diagnostic Interview Note      Referral:  Wing Kwok PA-C    Subha Sanchez is a 24 y.o. left handed single [facility] female who was unaccompanied to the initial clinical interview on 11/15/21. Please refer to her medical records for details pertaining to her history. At the start of the appointment, I reviewed the patient's [facility] Epic Chart (including Media scanned in from previous providers) for the active Problem List, all pertinent Past Medical Hx, medications, recent radiologic and laboratory findings. In addition, I reviewed pt's documented Immunization Record and Encounter History. She completed high school and was in private school and had to repeat . She struggles with focus and concentration and she was struggling with comprehension. She did end up graduating on time, but family thinks that she is \"slow. 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 She gets pushed on by her family. Struggles with learning, focus, and concentration. She gets very emotional about it. She lives alone. She can have a hard time with focus and attention and concentration. Lots of things slip her mind. A lot of things slip her mind. There are things she can't remember. She will have a conversation. This is something that has been going on a long time. She has been in denial about it. She is on vitamins and not other meds. She is not sleeping well-, and has to be up around 7:30. Appetite is always up and down. She struggles significantly with mood swings. The question of bipolar is raised. 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 She gets very frustrated.  She has problems with verbal aggression and sometimes people don't like what she has to say. She smokes marijuana- blunt - and use varies. She can smoke from 1-3 a day just depending on how stressed she is. She is tired often. She thinks fast. Mind races. She rushes cognitively. Has a hard time breathing and calming down. She did have covid in August and has been having light sensitivity and headaches since. She gets depressed often and tends to be socially withdrawn. Stays inside a lot due to crime outside. She tends to be introverted. She gets tired fast and has limited energy. No previous neuropsych. Neuropsychological Mental Status Exam (NMSE):      Historian: Good  Praxis: No UE apraxia  R/L Orientation: Intact to self and to other  Dress: within normal limits   Weight: within normal limits   Appearance/Hygiene: within normal limits   Gait: within normal limits   Assistive Devices: None  Mood: within normal limits   Affect: within normal limits   Comprehension: within normal limits   Thought Process: within normal limits   Expressive Language: within normal limits   Receptive Language: within normal limits   Motor:  No cognitive or motor perseveration  ETOH: Rarely  Tobacco: Denied  Marijuana: 2-3 a day if not less, helps with stress  Illicit: Denied  SI/HI: one OD when a teenager. Denied HI. Denied current  Psychosis: Denied  Insight: Within normal limits  Judgment: Within normal limits  Other Psych:      Past Medical History:   Diagnosis Date    Asthma        No past surgical history on file. No Known Allergies    Family History   Problem Relation Age of Onset    Other Mother         \"stomach problem\"    Diabetes Maternal Grandmother     Psychiatric Disorder Paternal Grandmother         not sure of official diagnosis    Diabetes Paternal Grandfather        Social History     Tobacco Use    Smoking status: Never Smoker    Smokeless tobacco: Never Used   Vaping Use    Vaping Use: Never used   Substance Use Topics    Alcohol use: No    Drug use:  Yes Types: Marijuana     Comment: patient smokes marijuana 2-3 times daily       Current Outpatient Medications   Medication Sig Dispense Refill    Flovent HFA 44 mcg/actuation inhaler Take 2 Puffs by inhalation every four (4) hours as needed.  naproxen (NAPROSYN) 500 mg tablet Take 1 Tablet by mouth two (2) times daily (with meals). 30 Tablet 0         Plan:  Obtain authorization for testing from insurance company. Report to follow once testing, scoring, and interpretation completed. ? Organic based neurocognitive issues versus mood disorder or combination of same. ? Problems organic, functional, or both? This note will not be viewable in [address]

## 2022-01-12 ENCOUNTER — OFFICE VISIT (OUTPATIENT)
Dept: URGENT CARE | Age: 22
End: 2022-01-12
Payer: MEDICAID

## 2022-01-12 VITALS — RESPIRATION RATE: 16 BRPM | HEART RATE: 71 BPM | TEMPERATURE: 98.6 F | OXYGEN SATURATION: 98 %

## 2022-01-12 DIAGNOSIS — Z20.822 SUSPECTED COVID-19 VIRUS INFECTION: Primary | ICD-10-CM

## 2022-01-12 PROCEDURE — 99212 OFFICE O/P EST SF 10 MIN: CPT | Performed by: FAMILY MEDICINE

## 2022-01-12 NOTE — PROGRESS NOTES
This patient was seen at [address] [place] [address] Urgent Care while in their vehicle due to COVID-19 pandemic with PPE and focused examination in order to decrease community viral transmission. The patient/guardian gave verbal consent to treat. Cold Symptoms  The history is provided by the patient. This is a new problem. Past Medical History:   Diagnosis Date    Asthma         History reviewed. No pertinent surgical history. Family History   Problem Relation Age of Onset    Other Mother         \"stomach problem\"    Diabetes Maternal Grandmother     Psychiatric Disorder Paternal Grandmother         not sure of official diagnosis    Diabetes Paternal Grandfather         Social History     Socioeconomic History    Marital status: SINGLE     Spouse name: Not on file    Number of children: Not on file    Years of education: Not on file    Highest education level: Not on file   Occupational History    Not on file   Tobacco Use    Smoking status: Never Smoker    Smokeless tobacco: Never Used   Vaping Use    Vaping Use: Never used   Substance and Sexual Activity    Alcohol use: No    Drug use: Yes     Types: Marijuana     Comment: patient smokes marijuana 2-3 times daily    Sexual activity: Yes     Partners: Male     Birth control/protection: None   Other Topics Concern    Not on file   Social History Narrative    Not on file     Social Determinants of Health     Financial Resource Strain:     Difficulty of Paying Living Expenses: Not on file   Food Insecurity:     Worried About Running Out of Food in the Last Year: Not on file    Reinaldo of Food in the Last Year: Not on file   Transportation Needs:     Lack of Transportation (Medical): Not on file    Lack of Transportation (Non-Medical):  Not on file   Physical Activity:     Days of Exercise per Week: Not on file    Minutes of Exercise per Session: Not on file   Stress:     Feeling of Stress : Not on file   Social Connections:     Frequency of Communication with Friends and Family: Not on file    Frequency of Social Gatherings with Friends and Family: Not on file    Attends Yazidi Services: Not on file    Active Member of Clubs or Organizations: Not on file    Attends Club or Organization Meetings: Not on file    Marital Status: Not on file   Intimate Partner Violence:     Fear of Current or Ex-Partner: Not on file    Emotionally Abused: Not on file    Physically Abused: Not on file    Sexually Abused: Not on file   Housing Stability:     Unable to Pay for Housing in the Last Year: Not on file    Number of [place] in the Last Year: Not on file    Unstable Housing in the Last Year: Not on file                ALLERGIES: Patient has no known allergies. Review of Systems   All other systems reviewed and are negative. There were no vitals filed for this visit. Physical Exam  Vitals and nursing note reviewed. Constitutional:       General: She is not in acute distress. Appearance: She is not ill-appearing. Pulmonary:      Effort: Pulmonary effort is normal. No respiratory distress. Breath sounds: No wheezing. Genitourinary:     Rectum: Normal.         MDM    Procedures        ICD-10-CM ICD-9-CM    1. Suspected COVID-19 virus infection  Z20.822 V01.79 NOVEL CORONAVIRUS (COVID-19)       Symptomatic rx  ispolationb x 5 days      No orders of the defined types were placed in this encounter. No results found for any visits on 01/12/22. The patients condition was discussed with the patient and they understand. The patient is to follow up with primary care doctor. If signs and symptoms become worse the pt is to go to the ER. The patient is to take medications as prescribed.

## 2022-01-15 LAB — SARS-COV-2, NAA: NOT DETECTED

## 2022-01-26 ENCOUNTER — OFFICE VISIT (OUTPATIENT)
Dept: NEUROLOGY | Age: 22
End: 2022-01-26
Payer: MEDICAID

## 2022-01-26 DIAGNOSIS — F31.9 BIPOLAR 1 DISORDER, DEPRESSED (HCC): Primary | ICD-10-CM

## 2022-01-26 DIAGNOSIS — F60.3 BORDERLINE PERSONALITY DISORDER IN ADULT (HCC): ICD-10-CM

## 2022-01-26 DIAGNOSIS — R41.83 BORDERLINE INTELLECTUAL FUNCTIONING: ICD-10-CM

## 2022-01-26 DIAGNOSIS — F41.9 SEVERE ANXIETY: ICD-10-CM

## 2022-01-26 DIAGNOSIS — F90.0 ATTENTION DEFICIT HYPERACTIVITY DISORDER (ADHD), INATTENTIVE TYPE, MILD: ICD-10-CM

## 2022-01-26 PROCEDURE — 96137 PSYCL/NRPSYC TST PHY/QHP EA: CPT | Performed by: CLINICAL NEUROPSYCHOLOGIST

## 2022-01-26 PROCEDURE — 96138 PSYCL/NRPSYC TECH 1ST: CPT | Performed by: CLINICAL NEUROPSYCHOLOGIST

## 2022-01-26 PROCEDURE — 96139 PSYCL/NRPSYC TST TECH EA: CPT | Performed by: CLINICAL NEUROPSYCHOLOGIST

## 2022-01-26 PROCEDURE — 96136 PSYCL/NRPSYC TST PHY/QHP 1ST: CPT | Performed by: CLINICAL NEUROPSYCHOLOGIST

## 2022-01-26 PROCEDURE — 96131 PSYCL TST EVAL PHYS/QHP EA: CPT | Performed by: CLINICAL NEUROPSYCHOLOGIST

## 2022-01-26 PROCEDURE — 96130 PSYCL TST EVAL PHYS/QHP 1ST: CPT | Performed by: CLINICAL NEUROPSYCHOLOGIST

## 2022-01-26 NOTE — LETTER
1/27/2022    Patient: Rufino Pandya   YOB: 2000   Date of Visit: 1/26/2022     Lina Pham PA-C  [address] [address]  [address] [facility] [address]    Dear Lina Pham PA-C,      Thank you for referring Ms. Rufino Pandya to [facility] for evaluation. My notes for this consultation are attached. If you have questions, please do not hesitate to call me. I look forward to following your patient along with you.       Sincerely,    Alondra Gordillo PsyD

## 2022-01-27 NOTE — PROGRESS NOTES
[address] [facility],[address]  [address]   [address] [place] [address] [facility]   [phone] Office   [phone] Fax      Psychological Evaluation Report  Referral:  General Jesus PA-C    Kami Lancaster is a 24 y.o. left handed single [facility] American female who was unaccompanied to the initial clinical interview on 11/15/21. Please refer to her medical records for details pertaining to her history. At the start of the appointment, I reviewed the patient's [facility] Epic Chart (including Media scanned in from previous providers) for the active Problem List, all pertinent Past Medical Hx, medications, recent radiologic and laboratory findings. In addition, I reviewed pt's documented Immunization Record and Encounter History. She completed high school and was in private school and had to repeat . She struggles with focus and concentration and she was struggling with comprehension. She did end up graduating on time, but family thinks that she is \"slow. 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 She gets pushed on by her family. Struggles with learning, focus, and concentration. She gets very emotional about it. She lives alone. She can have a hard time with focus and attention and concentration. Lots of things slip her mind. A lot of things slip her mind. There are things she can't remember. She will have a conversation. This is something that has been going on a long time. She has been in denial about it. She is on vitamins and not other meds. She is not sleeping well-, and has to be up around 7:30. Appetite is always up and down. She struggles significantly with mood swings. The question of bipolar is raised. 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 She gets very frustrated. She has problems with verbal aggression and sometimes people don't like what she has to say.   She smokes marijuana- blunt - and use varies. She can smoke from 1-3 a day just depending on how stressed she is. She is tired often. She thinks fast. Mind races. She rushes cognitively. Has a hard time breathing and calming down. She did have covid in August and has been having light sensitivity and headaches since. She gets depressed often and tends to be socially withdrawn. Stays inside a lot due to crime outside. She tends to be introverted. She gets tired fast and has limited energy. No previous neuropsych. Neuropsychological Mental Status Exam (NMSE):      Historian: Good  Praxis: No UE apraxia  R/L Orientation: Intact to self and to other  Dress: within normal limits   Weight: within normal limits   Appearance/Hygiene: within normal limits   Gait: within normal limits   Assistive Devices: None  Mood: within normal limits   Affect: within normal limits   Comprehension: within normal limits   Thought Process: within normal limits   Expressive Language: within normal limits   Receptive Language: within normal limits   Motor:  No cognitive or motor perseveration  ETOH: Rarely  Tobacco: Denied  Marijuana: 2-3 a day if not less, helps with stress  Illicit: Denied  SI/HI: one OD when a teenager. Denied HI. Denied current  Psychosis: Denied  Insight: Within normal limits  Judgment: Within normal limits  Other Psych:      Past Medical History:   Diagnosis Date    Asthma        History reviewed. No pertinent surgical history.     No Known Allergies    Family History   Problem Relation Age of Onset    Other Mother         \"stomach problem\"    Diabetes Maternal Grandmother     Psychiatric Disorder Paternal Grandmother         not sure of official diagnosis    Diabetes Paternal Grandfather        Social History     Tobacco Use    Smoking status: Never Smoker    Smokeless tobacco: Never Used   Vaping Use    Vaping Use: Never used   Substance Use Topics    Alcohol use: No    Drug use: Yes     Types: Marijuana     Comment: patient smokes marijuana 2-3 times daily       Current Outpatient Medications   Medication Sig Dispense Refill    Flovent HFA 44 mcg/actuation inhaler Take 2 Puffs by inhalation every four (4) hours as needed. Plan:  Obtain authorization for testing from insurance company. Report to follow once testing, scoring, and interpretation completed. ? Organic based neurocognitive issues versus mood disorder or combination of same. ? Problems organic, functional, or both? This note will not be viewable in [address] Psychological Evaluation Results Follow  Patient Testing 1/26/22 Report Completed 1/27/22  A Psychometrist Assisted w/ portions of this evaluation while under my direct supervision    Neuropsychologist Administered, Interpreted, & Reported: Neuropsychological Mental Status Exam, Revised Memory & Behavior Checklist, MMSE, Clock Drawing, Test Of Premorbid Functioning, Bismark Eliane Adult ADD Scales, History Taking  & Clinical Interview With The Patient,  NELLY, CPT, Ismael-Melzack Pain Questionnaire, Review Of Available Records*. Psychometrist Administered & Neuropsychologist Interpreted & Neuropsychologist Reported: Speech-Sounds Perception Test, PETE, Paced Serial Addition Test, Wechsler Adult Intelligence Scale  IV, Verbal Fluency Tests, Jimmy & Jimmy  Revised, Trailmaking Test Parts A & B, Buschke Selective Reminding Test, Dandre Complex Figure Test, Beck Depression Inventory  II, Beck Anxiety Inventory,. Test Findings:  Note:  The patients raw data have been compared with currently available norms which include demographic corrections for age, gender, and/or education. Sometimes, the patients scores are compared to demographically similar individuals as close to the patients age, education level, etc., as possible. \"Average\" is viewed as being +/- 1 standard deviation (SD) from the stated mean for a particular test score.   \"Low average\" is viewed as being between 1 and 2 SD below the mean, and above average is viewed as being 1 and 2 SD above the mean. Scores falling in the borderline range (between 1-1/2 and 2 SD below the mean) are viewed with particular attention as to whether they are normal or abnormal neurocognitive test scores. Other methods of inference in analyzing the test data are also utilized, including the pattern and range of scores in the profile, bilateral motor functions, and the presence, if any, of pathognomonic signs. Behaviorally, the patient was friendly and cooperative and appeared motivated to perform well during this examination. Within this context, the results of this evaluation are viewed as a valid reflection of the patients actual neurocognitive and emotional status. The patient's self reported score of 67 on the [facility] Adult ADD Scales was within the elevated risk range for ADD related concerns. The patient was administered the [facility] Continuous Performance Test  III, a computer-administered test of sustained attention, and review of the subscales within this instrument revealed mild concerns for inattentiveness with additional concern for impulsivity. Nonverbal auditory attention and discrimination, as assessed by the PETE, was also impaired. High level auditory information processing speed, as assessed by the Paced Serial Addition Test, was within the impaired range (- 1.73 SD) for Trial 1. This pattern of performance is indicative of a patient who is at increased risk for day-to-day problems with sustained visual attention/concentration, auditory attention, and high level auditory information processing speed abilities. The patient was administered the Wechsler Adult Intelligence Scale  IV. See Appendix I for full breakdown of IQ test scores (scanned into media section of this EMR).   As can be seen, there was no clinically significant difference between her borderline range Working Memory Index score of 74 ($th %ile) was borderline and her low average range Processing Speed Index score of 81 (10th %ile). Her Verbal Comprehension Index score of 74 (4th %ile) was borderline. Her Perceptual Reasoning Index score of 82 (12th I%ile) was borderline. Full scale IQ score of 74 (4th %ile) is borderline. Scores are commensurate with what would be expected based on her performance on a test assessing estimated premorbid levels of functioning. The patient was administered the Reading subtests of the [employer]- IV Tests of Achievement. Her domain scores are a follows:    Cluster   SS    Reading  73  Broad Reading 75  Basic Reading Sk 64  Reading Fluency 81    As can be seen, reading scores are mostly commensurate with generate IQ domain scores. No evidence of LD in reading is noted. The patient was administered the Buschke Selective Reminding Test and her basic learning and memory on this test (91/144) was within normal limits. In this regard, her efficiency related Consistent Long Term Recall score was impaired (22/144), especially when compared to her normal range Long Term Storage (86/144). Her discrepancy score (+ 64 points) on the Buschke Selective Reminding Test is clinically significant and is suggestive of a high level cognitive organization impairment and/or high level attention problem. Otherwise, her auditory memory abilities are within normal limits. The patients performance on the copy portion of the Dandre Complex Figure Test was within normal limits. Recall for the complex design was also within normal limits after both short and long delays. Recognition recall was average (T = 60; 84th %ile). This pattern of performance is not indicative of a patient who is at increased risk for day-to-day problems with visual organization and visual delayed memory. Simple timed visual motor sequencing (Trailmaking Test Part A) was within the average range with a T score of 49.   Her performance on a similar, but more complex task of timed visual motor sequencing (Trailmaking Test Part B) was within the below average range with a T score of 41. She made zero sequencing errors on this latter test.   Taken together, this pattern of performance is not indicative of a patient who is at increased risk for day-to-day problems with executive functioning. The patient rated her current level of pain as \"1/5 - Mild\" on the Ismael-Melzack Pain Questionnaire. She reported pain in her      Her Calabrese Depression Inventory II score of 22 was within the moderately depressed range. Her Calabrese Anxiety Inventory score of 35 reflected severe anxiety. The patient was also administered the Personality Assessment Inventory and generated a valid profile for interpretation, though there is a \"cry for help\" pattern of responding here. Within this context, there is social isolation with particular difficulties interpreting the normal nuances of interpersonal behavior the provide meaning to relationships. She is likely to be quite emotionally labile, manifesting rapid and extreme mood swings along with episodes of poorly controlled anger. She appears uncertain about major life issues and has little sense of direction or purpose in her life as it currently stands. She has a history of involvement in intense and volatile relationships and tends to be preoccupied with consistent fears of being abandoned or rejected by those around her. She is extremely sensitive in her interactions with others and is quick to feel as though she is being treated inequitably. She tends to respond by holding grudges and so significant suspiciousness and hostility and hypervigilance is noted. Marked depression and marked anxiety issues are present. There is strong support for fernando. She is quite impulsive and unusually energetic. Numerous maladaptive behavior patterns aimed at controlling anxiety are present.   There is strong support for PTSD. She is concerned about some to issues to the degree whereby her ability to focus and to attend are significantly compromised. Interpersonally, she tends to be cold and unfeeling at times. She can be easily angered, have difficulty controlling the expression of her anger, and is perceived by other people as having a hostile, angry temperament. Her self-reported level of treatment motivation is somewhat low, despite recognition that a number of areas of her life are not going well at this time. Treatment is likely to be lengthy and arduous, with a higher probability of reversals. Any impasse will need to be handled with particular care. Impressions & Recommendations:  From the actual neurocognitive profile, there is strong support for a diagnosis of inattentive ADHD. She is also showing problems with high-level cognitive organization. IQ is functioning generally within the borderline range. There is no evidence of a learning disorder in the domain of reading. Executive functioning abilities are normal.  From an emotional standpoint, this is a complicated case. In this regard, the strong evidence of bipolar disorder, severe anxiety, borderline personality disorder, PTSD. Anxiety is significant to the degree whereby her ability to focus and to attend are significantly compromised. The pattern of normal versus abnormal neurocognitive test scores suggests that ADHD is a separate issue from emotional distress. The former is organic and the latter a combination of organic and functional issues. I am more concerned about her mood and I am the more mild ADHDinattentive. I suggest a stepwise approach to treatment. First, in addition to continued medical care, my recommendations include psychiatric consultation for medication management for bipolar  and anxiety along with active engagement in intensive psychotherapy. Consider EMDR. Consider DBT.   Consider also an appropriate medication for attention if is not medically contraindicated, though caution is advised in selecting same, given the propensity towards anxiety here. Treatment can be expected to be more long-terminterpersonal dynamics and trauma issues need to be dealt with in a slow but consistent fashion. With treatment, her prognosis improves. We now have extensive baseline neurocognitive and psychologic data on her. Follow-up as needed. Clinical correlation is, course, indicated. I will discuss these findings with the patient when she follows up with me in the near future. A follow up Neuropsychological Evaluation is indicated on a prn basis, especially if there are any cognitive and/or emotional changes. DIAGNOSES: Bipolar Disorder    Severe Anxiety    PTSD    Borderline Personality in Adult    ADHD, Inattentive, Moderate    Borderline IQ       The above information is based upon information currently available to me. If there is any additional information of which I am currently unaware, I would be more than happy to review it upon having it made available to me. Thank you for the opportunity to see this interesting individual.     Sincerely,       Aniyah Ramirez.  Shaye Medrano PsyD, EdS    Cc: Anamaria Carney PA-C     Time Documentation:      23889 x 1 04278*7 Test administration/data gathering by Neuropsychologist (see above), 60 minutes  96138 x 1 Test administration, data gathering by technician (1st 30 minutes), 30 minutes  96139 x 5 Test administration, data gathering by technician (each additional 30 minutes), 3 hours (total tech 3 hours)   96130 x 1 Testing Evaluation Services By Neuropsychologist, 1st hour  15971 x 1 Testing Evaluation Services by Neuropsychologist, 2nd hour (45 minutes)  This includes review of referral question, reviewing records, planning test battery (50 minutes prior to testing date), and interpreting data (30 minutes), and interpretation and report writing (50 minutes) Anticipated Integrated Feedback (37283) - Service to be completed on a future date and not currently billed. The above includes: Record review. Review of history provided by patient. Review of collaborative information. Testing by Clinician. Review of raw data. Scoring. Report writing of individual tests administered by Clinician. Integration of individual tests administered by psychometrist with NSE/testing by clinician, review of records/history/collaborative information, case Conceptualization, treatment planning, clinical decision making, report writing, coordination Of Care. Psychometry test codes as time spent by psychometrist administering and scoring neurocognitive/psychological tests under supervision of neuropsychologist.  Integral services including scoring of raw data, data interpretation, case conceptualization, report writing etcetera were initiated after the patient finished testing/raw data collected and was completed on the date the report was signed.

## 2022-02-28 ENCOUNTER — VIRTUAL VISIT (OUTPATIENT)
Dept: INTERNAL MEDICINE CLINIC | Age: 22
End: 2022-02-28
Payer: MEDICAID

## 2022-02-28 DIAGNOSIS — F60.3 BORDERLINE PERSONALITY DISORDER (HCC): ICD-10-CM

## 2022-02-28 DIAGNOSIS — F43.10 PTSD (POST-TRAUMATIC STRESS DISORDER): ICD-10-CM

## 2022-02-28 DIAGNOSIS — F41.9 ANXIETY: ICD-10-CM

## 2022-02-28 DIAGNOSIS — F31.9 BIPOLAR 1 DISORDER, DEPRESSED (HCC): Primary | ICD-10-CM

## 2022-02-28 DIAGNOSIS — F90.0 ADHD (ATTENTION DEFICIT HYPERACTIVITY DISORDER), INATTENTIVE TYPE: ICD-10-CM

## 2022-02-28 PROCEDURE — 99213 OFFICE O/P EST LOW 20 MIN: CPT | Performed by: PHYSICIAN ASSISTANT

## 2022-02-28 NOTE — PROGRESS NOTES
Alma Murphy is a 25 y.o. female who was seen by synchronous (real-time) audio-video technology on 2/28/2022 for Follow-up        Assessment & Plan:   Diagnoses and all orders for this visit:    1. Bipolar 1 disorder, depressed ([facility] [address]    2. Anxiety    3. PTSD (post-traumatic stress disorder)    4. Borderline personality disorder ([facility] [address]    5. ADHD (attention deficit hyperactivity disorder), inattentive type    I reviewed her most recent psych testing. I have asked the patient to call the number on the back of her insurance card to find a mental health professional. She understands that these diagnoses is beyond my scope of practice. She will let me know when she will be seeing someone. I spent at least 20 minutes on this visit with this established patient. 712  Subjective:   Patient presents to discuss her most recent results from her neuropsych testing. She states she is not scheduled to follow up with him. The patient reports overall she has been doing okay. She has been feeling a little sick with a sore throat but that is better. Prior to Admission medications    Medication Sig Start Date End Date Taking? Authorizing Provider   Flovent HFA 44 mcg/actuation inhaler Take 2 Puffs by inhalation every four (4) hours as needed. 8/24/21  Yes Provider, Historical     No Known Allergies    ROS    Objective:   No flowsheet data found. General: alert, cooperative, no distress   Mental  status: normal mood, behavior, speech, dress, motor activity, and thought processes, able to follow commands   HENT: NCAT   Neck: no visualized mass   Resp: no respiratory distress   Neuro: no gross deficits   Skin: no discoloration or lesions of concern on visible areas   Psychiatric: normal affect, consistent with stated mood, no evidence of hallucinations     Additional exam findings: We discussed the expected course, resolution and complications of the diagnosis(es) in detail.   Medication risks, benefits, costs, interactions, and alternatives were discussed as indicated. I advised her to contact the office if her condition worsens, changes or fails to improve as anticipated. She expressed understanding with the diagnosis(es) and plan. Omar Donald, was evaluated through a synchronous (real-time) audio-video encounter. The patient (or guardian if applicable) is aware that this is a billable service, which includes applicable co-pays. Verbal consent to proceed has been obtained. The visit was conducted pursuant to the emergency declaration under the [address] [place] [address], [address] [place] [address] authority and the [employer] Act. Patient identification was verified, and a caregiver was present when appropriate. The patient was located at home in a state where the provider was licensed to provide care.     Daniele Yung PA-C

## 2022-02-28 NOTE — PROGRESS NOTES
1. \"Have you been to the ER, urgent care clinic since your last visit? Hospitalized since your last visit? \" No    2. \"Have you seen or consulted any other health care providers outside of the [address] [place] [address] since your last visit? \" Yes, Neurology    3. For patients aged 39-70: Has the patient had a colonoscopy / FIT/ Cologuard? If the patient is female:    4. For patients aged 41-77: Has the patient had a mammogram within the past 2 years? 5. For patients aged 21-65: Has the patient had a pap smear? No    Health Maintenance Due   Topic Date Due    Hepatitis C Screening  Never done    COVID-19 Vaccine (1) Never done    HPV Age 9Y-34Y (1 - 2-dose series) Never done    DTaP/Tdap/Td series (1 - Tdap) Never done    Pap Smear  Never done    Flu Vaccine (1) Never done     Patient here to follow up Neurology visit.

## 2022-04-18 ENCOUNTER — TELEPHONE (OUTPATIENT)
Dept: NEUROLOGY | Age: 22
End: 2022-04-18

## 2022-07-05 ENCOUNTER — OFFICE VISIT (OUTPATIENT)
Dept: NEUROLOGY | Age: 22
End: 2022-07-05
Payer: MEDICAID

## 2022-07-05 DIAGNOSIS — R41.83 BORDERLINE INTELLECTUAL FUNCTIONING: ICD-10-CM

## 2022-07-05 DIAGNOSIS — F60.3 BORDERLINE PERSONALITY DISORDER IN ADULT (HCC): ICD-10-CM

## 2022-07-05 DIAGNOSIS — F41.9 SEVERE ANXIETY: ICD-10-CM

## 2022-07-05 DIAGNOSIS — F90.0 ATTENTION DEFICIT HYPERACTIVITY DISORDER (ADHD), INATTENTIVE TYPE, MILD: ICD-10-CM

## 2022-07-05 DIAGNOSIS — F31.9 BIPOLAR 1 DISORDER, DEPRESSED (HCC): Primary | ICD-10-CM

## 2022-07-05 PROCEDURE — 90832 PSYTX W PT 30 MINUTES: CPT | Performed by: CLINICAL NEUROPSYCHOLOGIST

## 2022-07-05 NOTE — PROGRESS NOTES
This is a teleneuropsychology (audio/visual) visit that was performed with in the originating site at patient's home and the distance site at [facility] outpatient clinic at [facility]. Verbal consent to participate in the video visit was obtained. This visit occurred during the corona (COVID -19) public health emergency and these visits were authorized by the President of the [place]. I discussed with the patient the nature of our teleneuropsych visit in that :    - I would evaluate the patient and recommend diagnostics and treatment based on my assessment and impressions, and/or provided test results and discussed these issues with the patient and/or family.    - Our sessions are not being recorded and that personal health information is protected    - Our team will provide follow-up care in person if when the patient needs it. Prior to seeing the patient I reviewed the records, including the previously completed report, the records in [facility], and any updated visits from other providers since I saw the patient last.      Today, I engaged in a psychoeducational and supportive and cognitive/behavioral psychotherapy session with the patient via teleneuropsychology. I provided psychotherapy in the form of psychoeducation and support with respect to the results of the recent Neuropsychological Evaluation, including discussing individual tests as well as patient's areas of neurocognitive strength versus weakness. We discussed, in detail, the following:      From the actual neurocognitive profile, there is strong support for a diagnosis of inattentive ADHD. She is also showing problems with high-level cognitive organization. IQ is functioning generally within the borderline range. There is no evidence of a learning disorder in the domain of reading. Executive functioning abilities are normal.  From an emotional standpoint, this is a complicated case.   In this regard, the strong evidence of bipolar disorder, severe anxiety, borderline personality disorder, PTSD. Anxiety is significant to the degree whereby her ability to focus and to attend are significantly compromised.                  The pattern of normal versus abnormal neurocognitive test scores suggests that ADHD is a separate issue from emotional distress. The former is organic and the latter a combination of organic and functional issues. I am more concerned about her mood and I am the more mild ADHD-inattentive. I suggest a stepwise approach to treatment. First, in addition to continued medical care, my recommendations include psychiatric consultation for medication management for bipolar  and anxiety along with active engagement in intensive psychotherapy. Consider EMDR. Consider DBT. Consider also an appropriate medication for attention if is not medically contraindicated, though caution is advised in selecting same, given the propensity towards anxiety here. Treatment can be expected to be more long-term-interpersonal dynamics and trauma issues need to be dealt with in a slow but consistent fashion. With treatment, her prognosis improves. We now have extensive baseline neurocognitive and psychologic data on her. Follow-up as needed. Clinical correlation is, course, indicated.                   I will discuss these findings with the patient when she follows up with me in the near future. A follow up Neuropsychological Evaluation is indicated on a prn basis, especially if there are any cognitive and/or emotional changes.       DIAGNOSES: Bipolar Disorder                          Severe Anxiety                          PTSD                          Borderline Personality in Adult                          ADHD, Inattentive, Moderate                          Borderline IQ                   Education was provided regarding my diagnostic impressions, and we discussed treatment plan/options.    I also answered numerous questions related to the clinical findings, including discussing various methods to improve cognition and mood. Counseling provided regarding mood and cognition. CBT and supportive psychotherapy techniques were utilized. Supportive/Cognitive Behavioral/Solution Focused psychotherapy provided  Discussed rational versus irrational thinking patterns and their consequences. Discussed healthy/adaptive and unhealthy/maladaptive coping. The patient needs to follow with psychiatry and psychology as needed. Will follow up with Psych. She understands and appreciates and agrees    The patient had the following concerns which I deferred to their referring provider: meds for mood/cognition      Time spent today: 20    Pursuant to the emergency declaration under the [facility], [address] [place] [address] authority and the [employer] Act, this Virtual  Visit (audio/visual) was conducted, with patient's consent, to reduce the patient's risk of exposure to COVID-19 and provide continuity of care. Services were provided in this manner to substitute for in-person clinic visit.

## 2022-08-08 ENCOUNTER — OFFICE VISIT (OUTPATIENT)
Dept: INTERNAL MEDICINE CLINIC | Age: 22
End: 2022-08-08
Payer: MEDICAID

## 2022-08-08 VITALS
DIASTOLIC BLOOD PRESSURE: 59 MMHG | RESPIRATION RATE: 16 BRPM | OXYGEN SATURATION: 99 % | TEMPERATURE: 98.4 F | BODY MASS INDEX: 26.05 KG/M2 | HEIGHT: 63 IN | HEART RATE: 60 BPM | SYSTOLIC BLOOD PRESSURE: 96 MMHG | WEIGHT: 147 LBS

## 2022-08-08 DIAGNOSIS — F41.9 ANXIETY AND DEPRESSION: ICD-10-CM

## 2022-08-08 DIAGNOSIS — F90.0 ATTENTION DEFICIT HYPERACTIVITY DISORDER (ADHD), PREDOMINANTLY INATTENTIVE TYPE: Primary | ICD-10-CM

## 2022-08-08 DIAGNOSIS — F32.A ANXIETY AND DEPRESSION: ICD-10-CM

## 2022-08-08 DIAGNOSIS — F31.9 BIPOLAR AFFECTIVE DISORDER, REMISSION STATUS UNSPECIFIED (HCC): ICD-10-CM

## 2022-08-08 PROCEDURE — 99213 OFFICE O/P EST LOW 20 MIN: CPT | Performed by: INTERNAL MEDICINE

## 2022-08-08 RX ORDER — BUPROPION HYDROCHLORIDE 150 MG/1
150 TABLET ORAL
Qty: 30 TABLET | Refills: 0 | Status: SHIPPED | OUTPATIENT
Start: 2022-08-08

## 2022-08-08 NOTE — PROGRESS NOTES
Chief Complaint   Patient presents with    Follow-up     Psychologist follow up    Other     Requesting letter for service dog     Patient would like to discuss service dog. Vitals:    08/08/22 1127   BP: (!) 96/59   Pulse: 60   Resp: 16   Temp: 98.4 °F (36.9 °C)   TempSrc: Temporal   SpO2: 99%   Weight: 147 lb (66.7 kg)   Height: 5' 3\" (1.6 m)   PainSc:  10 - Worst pain ever   PainLoc: Abdomen         Health Maintenance will be followed up by PCP. 1. Have you been to the ER, urgent care clinic since your last visit? Hospitalized since your last visit? No    2. Have you seen or consulted any other health care providers outside of the [address] [place] [address] since your last visit? Include any pap smears or colon screening.  No

## 2022-08-08 NOTE — PROGRESS NOTES
Ricardo Laguerre is a 25 y.o. female  Chief Complaint   Patient presents with    Follow-up     Psychologist follow up    Other     Requesting letter for service dog     Visit Vitals  BP (!) 96/59 (BP 1 Location: Left upper arm, BP Patient Position: Sitting, BP Cuff Size: Adult)   Pulse 60   Temp 98.4 °F (36.9 °C) (Temporal)   Resp 16   Ht 5' 3\" (1.6 m)   Wt 147 lb (66.7 kg)   SpO2 99%   BMI 26.04 kg/m²          HPI  Ms. Jonathon Evans is a 25 y.o. female with recent diagnosis of ADHD, anxiety, PTSD, borderline personality presents to clinic for management of her psychiatric problems. Any suicidal ideation or homicidal ideation. She reports that she has American bulldog and she uses it as/supports and wants paperwork of support animal, [employer]. 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 She wants to get treatment for it because she has severe trouble with focus and attention. Because of concurrent anxiety and depression, will start bupropion and see how it works in about 3 months. In the meantime I will refer her to psychiatrist    Social History     Socioeconomic History    Marital status: SINGLE   Tobacco Use    Smoking status: Never    Smokeless tobacco: Never   Vaping Use    Vaping Use: Never used   Substance and Sexual Activity    Alcohol use: No    Drug use: Yes     Types: Marijuana     Comment: patient smokes marijuana 2-3 times daily    Sexual activity: Yes     Partners: Male     Birth control/protection: None      . Past Medical History:   Diagnosis Date    Asthma         No Known Allergies       ROS  Review of Systems   All other systems reviewed and are negative. EXAM  Physical Exam  Vitals reviewed. Cardiovascular:      Heart sounds: Normal heart sounds. No murmur heard. Pulmonary:      Effort: Pulmonary effort is normal.   Psychiatric:         Mood and Affect: Mood normal.         Behavior: Behavior normal.         Thought Content:  Thought content normal.     Health Maintenance Due   Topic Date Due    Hepatitis C Screening  Never done    COVID-19 Vaccine (1) Never done    HPV Age 9Y-34Y (1 - 2-dose series) Never done    DTaP/Tdap/Td series (1 - Tdap) Never done    Pap Smear  Never done       ASSESSMENT/PLAN    Diagnoses and all orders for this visit:    1. Attention deficit hyperactivity disorder (ADHD), predominantly inattentive type  -     buPROPion XL (WELLBUTRIN XL) 150 mg tablet; Take 1 Tablet by mouth every morning.  -     REFERRAL TO PSYCHIATRY    2. Bipolar affective disorder, remission status unspecified (HCC)  -     REFERRAL TO PSYCHIATRY    3. Anxiety and depression  -     buPROPion XL (WELLBUTRIN XL) 150 mg tablet;  Take 1 Tablet by mouth every morning.  -     REFERRAL TO PSYCHIATRY    -EMRE letter has to be written by a psychiatrist     Jose Luis Bunn MD

## 2022-08-22 ENCOUNTER — OFFICE VISIT (OUTPATIENT)
Dept: INTERNAL MEDICINE CLINIC | Age: 22
End: 2022-08-22
Payer: MEDICAID

## 2022-08-22 VITALS
OXYGEN SATURATION: 98 % | RESPIRATION RATE: 18 BRPM | SYSTOLIC BLOOD PRESSURE: 110 MMHG | DIASTOLIC BLOOD PRESSURE: 73 MMHG | TEMPERATURE: 97.9 F | HEIGHT: 63 IN | WEIGHT: 144.2 LBS | BODY MASS INDEX: 25.55 KG/M2 | HEART RATE: 90 BPM

## 2022-08-22 DIAGNOSIS — J01.10 ACUTE NON-RECURRENT FRONTAL SINUSITIS: Primary | ICD-10-CM

## 2022-08-22 PROCEDURE — 99213 OFFICE O/P EST LOW 20 MIN: CPT | Performed by: INTERNAL MEDICINE

## 2022-08-22 RX ORDER — AZITHROMYCIN 250 MG/1
250 TABLET, FILM COATED ORAL SEE ADMIN INSTRUCTIONS
Qty: 6 TABLET | Refills: 0 | Status: SHIPPED | OUTPATIENT
Start: 2022-08-22 | End: 2022-08-27

## 2022-08-22 NOTE — PROGRESS NOTES
Reviewed record in preparation for visit and have obtained necessary documentation. Identified pt with two pt identifiers(name and ). Chief Complaint   Patient presents with    Headache     Patient states headaches started this past weekend after being prescribed medication for depression      Blood pressure 110/73, pulse 90, temperature 97.9 °F (36.6 °C), temperature source Temporal, resp. rate 18, height 5' 3\" (1.6 m), weight 144 lb 3.2 oz (65.4 kg), SpO2 98 %. Health Maintenance Due   Topic Date Due    Hepatitis C Test  Never done    COVID-19 Vaccine (1) Never done    HPV Vaccine (1 - 2-dose series) Never done    DTaP/Tdap/Td  (1 - Tdap) Never done    Pap Smear  Never done       Ms. Shannon Collier has a reminder for a \"due or due soon\" health maintenance. I have asked that she discuss this further with her primary care provider for follow-up on this health maintenance. Coordination of Care Questionnaire:  :     1) Have you been to an emergency room, urgent care clinic since your last visit? no   Hospitalized since your last visit? no             2) Have you seen or consulted any other health care providers outside of [address] [place] [address] since your last visit? no      3) In the event something were to happen to you and you were unable to speak on your behalf, do you have an Advance Directive/ Living Will in place stating your wishes?  NO

## 2023-03-16 ENCOUNTER — TELEPHONE (OUTPATIENT)
Dept: INTERNAL MEDICINE CLINIC | Age: 23
End: 2023-03-16

## 2023-03-16 DIAGNOSIS — R41.840 ATTENTION DEFICIT: ICD-10-CM

## 2023-03-16 DIAGNOSIS — F31.9 BIPOLAR AFFECTIVE DISORDER, REMISSION STATUS UNSPECIFIED (HCC): Primary | ICD-10-CM

## 2023-03-17 ENCOUNTER — OFFICE VISIT (OUTPATIENT)
Dept: INTERNAL MEDICINE CLINIC | Age: 23
End: 2023-03-17

## 2023-03-17 ENCOUNTER — TELEPHONE (OUTPATIENT)
Dept: INTERNAL MEDICINE CLINIC | Age: 23
End: 2023-03-17

## 2023-03-17 VITALS
HEIGHT: 63 IN | HEART RATE: 72 BPM | RESPIRATION RATE: 20 BRPM | TEMPERATURE: 98.5 F | OXYGEN SATURATION: 98 % | WEIGHT: 150.2 LBS | SYSTOLIC BLOOD PRESSURE: 106 MMHG | BODY MASS INDEX: 26.61 KG/M2 | DIASTOLIC BLOOD PRESSURE: 67 MMHG

## 2023-03-17 DIAGNOSIS — F31.9 BIPOLAR 1 DISORDER (HCC): ICD-10-CM

## 2023-03-17 DIAGNOSIS — F90.9 ATTENTION DEFICIT HYPERACTIVITY DISORDER (ADHD), UNSPECIFIED ADHD TYPE: ICD-10-CM

## 2023-03-17 DIAGNOSIS — Z83.3 FAMILY HISTORY OF DIABETES MELLITUS (DM): ICD-10-CM

## 2023-03-17 DIAGNOSIS — R53.83 FATIGUE, UNSPECIFIED TYPE: ICD-10-CM

## 2023-03-17 DIAGNOSIS — E55.9 VITAMIN D DEFICIENCY: ICD-10-CM

## 2023-03-17 DIAGNOSIS — Z00.00 PHYSICAL EXAM, ANNUAL: ICD-10-CM

## 2023-03-17 DIAGNOSIS — K58.1 IRRITABLE BOWEL SYNDROME WITH CONSTIPATION: Primary | ICD-10-CM

## 2023-03-17 DIAGNOSIS — Z11.59 NEED FOR HEPATITIS C SCREENING TEST: ICD-10-CM

## 2023-03-17 RX ORDER — DICYCLOMINE HYDROCHLORIDE 10 MG/1
10 CAPSULE ORAL
Qty: 30 CAPSULE | Refills: 0 | Status: SHIPPED | OUTPATIENT
Start: 2023-03-17 | End: 2023-04-16

## 2023-03-17 NOTE — PROGRESS NOTES
Patient has been having stomach pain off and on since 1/28/23. She took a pregnancy test results were negative. She feel like it is occurring when she is eating certain foods. Patient does want to do an overall wellness check up. Patient also would like to discuss her mental health mainly her mood swings and altitude. No chief complaint on file. Vitals:    03/17/23 1538   Temp: 98.5 °F (36.9 °C)   TempSrc: Temporal   Weight: 150 lb 3.2 oz (68.1 kg)       Current Outpatient Medications on File Prior to Visit   Medication Sig Dispense Refill    buPROPion XL (WELLBUTRIN XL) 150 mg tablet Take 1 Tablet by mouth every morning. (Patient not taking: No sig reported) 30 Tablet 0    Flovent HFA 44 mcg/actuation inhaler Take 2 Puffs by inhalation every four (4) hours as needed. (Patient not taking: No sig reported)       No current facility-administered medications on file prior to visit. Health Maintenance Due   Topic    Hepatitis C Screening     COVID-19 Vaccine (1)    HPV Age 9Y-34Y (1 - 2-dose series)    DTaP/Tdap/Td series (1 - Tdap)    Pap Smear     Flu Vaccine (1)       1. \"Have you been to the ER, urgent care clinic since your last visit? Hospitalized since your last visit? \" Yes [facility]. 2. \"Have you seen or consulted any other health care providers outside of the [address] [place] [address] since your last visit? \" No     3. For patients aged 39-70: Has the patient had a colonoscopy / FIT/ Cologuard? No      If the patient is female:    4. For patients aged 41-77: Has the patient had a mammogram within the past 2 years? No      5. For patients aged 21-65: Has the patient had a pap smear?  No

## 2023-03-17 NOTE — PROGRESS NOTES
Kirit Gillespie is a 21 y.o. female  Chief Complaint   Patient presents with    Abdominal Pain     Visit Vitals  /67 (BP 1 Location: Right upper arm, BP Patient Position: Sitting, BP Cuff Size: Adult)   Pulse 72   Temp 98.5 °F (36.9 °C) (Temporal)   Resp 20   Ht 5' 3\" (1.6 m)   Wt 150 lb 3.2 oz (68.1 kg)   SpO2 98%   BMI 26.61 kg/m²          HPI  Ms. Kirit Gillespie presents today with ongoing abdominal pain that is triggered with certain foods. Pain has associated nausea with out vomting. Foods that trigger her pains include milky drinks from [employer], greens, etc. She also feels irritable and reports a history of bipolar disorder, she has a psychiatrist she wants to see. Patient also likes to get complete physical.     .  Past Medical History:   Diagnosis Date    Asthma             ROS  Review of Systems   All other systems reviewed and are negative. EXAM  Physical Exam  Constitutional:       General: She is not in acute distress. Appearance: Normal appearance. HENT:      Head: Normocephalic and atraumatic. Right Ear: Tympanic membrane normal.      Left Ear: Tympanic membrane normal.      Mouth/Throat:      Mouth: Mucous membranes are moist.      Pharynx: Oropharynx is clear. Eyes:      Extraocular Movements: Extraocular movements intact. Cardiovascular:      Rate and Rhythm: Normal rate and regular rhythm. Heart sounds: No murmur heard. No gallop. Pulmonary:      Effort: No respiratory distress. Breath sounds: Normal breath sounds. No wheezing or rales. Abdominal:      General: Bowel sounds are normal. There is no distension. Palpations: There is no mass. Tenderness: There is no abdominal tenderness. There is no guarding. Musculoskeletal:         General: No swelling, tenderness or deformity. Normal range of motion. Right lower leg: No edema. Left lower leg: No edema. Skin:     Findings: No lesion.    Neurological:      General: No focal deficit present. Mental Status: She is alert. Motor: No weakness. Psychiatric:         Mood and Affect: Mood normal.       Health Maintenance Due   Topic Date Due    Hepatitis C Screening  Never done    COVID-19 Vaccine (1) Never done    HPV Age 9Y-34Y (1 - 2-dose series) Never done    DTaP/Tdap/Td series (1 - Tdap) Never done    Pap Smear  Never done    Flu Vaccine (1) Never done       ASSESSMENT/PLAN    Diagnoses and all orders for this visit:    1. Irritable bowel syndrome with constipation  -     dicyclomine (BENTYL) 10 mg capsule; Take 1 Capsule by mouth three (3) times daily as needed for Abdominal Cramps for up to 30 days. -     CBC WITH AUTOMATED DIFF; Future    2. Physical exam, annual  -     FERRITIN; Future  -     HEMOGLOBIN A1C WITH EAG; Future  -     LIPID PANEL; Future  -     THYROID CASCADE PROFILE; Future  -     CBC WITH AUTOMATED DIFF; Future  -     METABOLIC PANEL, COMPREHENSIVE; Future  -     HEPATITIS C AB; Future  -     VITAMIN D, 25 HYDROXY; Future  -     HCG URINE, QL; Future    3. Attention deficit hyperactivity disorder (ADHD), unspecified ADHD type  -     REFERRAL TO PSYCHIATRY  -     CBC WITH AUTOMATED DIFF; Future  -     METABOLIC PANEL, COMPREHENSIVE; Future    4. Vitamin D deficiency  -     CBC WITH AUTOMATED DIFF; Future  -     METABOLIC PANEL, COMPREHENSIVE; Future  -     VITAMIN D, 25 HYDROXY; Future    5. Bipolar 1 disorder (HCC)  -     REFERRAL TO PSYCHIATRY  -     CBC WITH AUTOMATED DIFF; Future  -     METABOLIC PANEL, COMPREHENSIVE; Future    6. Need for hepatitis C screening test  -     HEPATITIS C AB; Future    7. Family history of diabetes mellitus (DM)  -     HEMOGLOBIN A1C WITH EAG; Future  -     LIPID PANEL; Future  -     METABOLIC PANEL, COMPREHENSIVE; Future    8. Fatigue, unspecified type  -     FERRITIN; Future  -     LIPID PANEL; Future  -     THYROID CASCADE PROFILE; Future  -     CBC WITH AUTOMATED DIFF;  Future  -     METABOLIC PANEL, COMPREHENSIVE; Future        Reina Salazar MD

## 2023-03-29 ENCOUNTER — OFFICE VISIT (OUTPATIENT)
Dept: BEHAVIORAL/MENTAL HEALTH CLINIC | Age: 23
End: 2023-03-29
Payer: MEDICAID

## 2023-03-29 VITALS
BODY MASS INDEX: 26.86 KG/M2 | HEIGHT: 63 IN | SYSTOLIC BLOOD PRESSURE: 115 MMHG | WEIGHT: 151.6 LBS | DIASTOLIC BLOOD PRESSURE: 70 MMHG | TEMPERATURE: 98.6 F | HEART RATE: 72 BPM | OXYGEN SATURATION: 98 % | RESPIRATION RATE: 16 BRPM

## 2023-03-29 DIAGNOSIS — F90.0 ATTENTION DEFICIT HYPERACTIVITY DISORDER (ADHD), PREDOMINANTLY INATTENTIVE TYPE: ICD-10-CM

## 2023-03-29 DIAGNOSIS — F41.9 ANXIETY AND DEPRESSION: ICD-10-CM

## 2023-03-29 DIAGNOSIS — F32.A ANXIETY AND DEPRESSION: ICD-10-CM

## 2023-03-29 PROCEDURE — 90792 PSYCH DIAG EVAL W/MED SRVCS: CPT | Performed by: NURSE PRACTITIONER

## 2023-03-29 RX ORDER — BUPROPION HYDROCHLORIDE 150 MG/1
150 TABLET ORAL
Qty: 30 TABLET | Refills: 0 | Status: SHIPPED | OUTPATIENT
Start: 2023-03-29

## 2023-03-29 NOTE — PROGRESS NOTES
Chief Complaint   Patient presents with    New Patient     Referred by Dr. Artemio Huerta for Bipolar 1, ADHD. Neuro psych evaluation completed a year ago by Dr. Rasheeda Sood per patient. Visit Vitals  /70 (BP 1 Location: Left upper arm, BP Patient Position: Sitting, BP Cuff Size: Adult)   Pulse 72   Temp 98.6 °F (37 °C) (Oral)   Resp 16   Ht 5' 3\" (1.6 m)   Wt 68.8 kg (151 lb 9.6 oz)   SpO2 98%   BMI 26.85 kg/m²     Prior to Admission medications    Medication Sig Start Date End Date Taking? Authorizing Provider   dicyclomine (BENTYL) 10 mg capsule Take 1 Capsule by mouth three (3) times daily as needed for Abdominal Cramps for up to 30 days. 3/17/23 4/16/23  Adan Sweet MD   buPROPion XL (WELLBUTRIN XL) 150 mg tablet Take 1 Tablet by mouth every morning. Patient not taking: No sig reported 8/8/22   Riki Atkinson MD   Flovent HFA 44 mcg/actuation inhaler Take 2 Puffs by inhalation every four (4) hours as needed.   Patient not taking: No sig reported 8/24/21   Provider, Historical     3 most recent [facility] 36 Screens 3/29/2023   Little interest or pleasure in doing things Nearly every day   Feeling down, depressed, irritable, or hopeless Nearly every day   Total Score PHQ 2 6   Trouble falling or staying asleep, or sleeping too much Nearly every day   Feeling tired or having little energy Nearly every day   Poor appetite, weight loss, or overeating More than half the days   Feeling bad about yourself - or that you are a failure or have let yourself or your family down Nearly every day   Trouble concentrating on things such as school, work, reading, or watching TV Not at all   Moving or speaking so slowly that other people could have noticed; or the opposite being so fidgety that others notice Not at all   Thoughts of being better off dead, or hurting yourself in some way Not at all   PHQ 9 Score 17   How difficult have these problems made it for you to do your work, take care of your home and get along with others Very difficult     CRISTI 2/7 3/29/2023 3/26/2023   Feeling nervous, anxious, or on edge 3 3   Not being able to stop or control worrying 3 3   Worrying too much about different things 3 3   Trouble relaxing 3 1   Being so restless that it is hard to sit still 3 1   Becoming easily annoyed or irritable 3 3   Feeling afraid as if something awful might happen 3 1   CRISTI-7 Total Score 21 15      PHQ 9 Score: 17 (3/29/2023 10:48 AM)  Thoughts of being better off dead, or hurting yourself in some way: 0 (3/29/2023 10:48 AM)

## 2023-03-30 LAB
25(OH)D3+25(OH)D2 SERPL-MCNC: 18.3 NG/ML (ref 30–100)
ALBUMIN SERPL-MCNC: 4.6 G/DL (ref 3.9–5)
ALBUMIN/GLOB SERPL: 1.8 {RATIO} (ref 1.2–2.2)
ALP SERPL-CCNC: 67 IU/L (ref 44–121)
ALT SERPL-CCNC: 12 IU/L (ref 0–32)
AST SERPL-CCNC: 17 IU/L (ref 0–40)
BASOPHILS # BLD AUTO: 0 X10E3/UL (ref 0–0.2)
BASOPHILS NFR BLD AUTO: 1 %
BILIRUB SERPL-MCNC: 0.4 MG/DL (ref 0–1.2)
BUN SERPL-MCNC: 9 MG/DL (ref 6–20)
BUN/CREAT SERPL: 12 (ref 9–23)
CALCIUM SERPL-MCNC: 8.8 MG/DL (ref 8.7–10.2)
CHLORIDE SERPL-SCNC: 102 MMOL/L (ref 96–106)
CHOLEST SERPL-MCNC: 172 MG/DL (ref 100–199)
CO2 SERPL-SCNC: 23 MMOL/L (ref 20–29)
CREAT SERPL-MCNC: 0.75 MG/DL (ref 0.57–1)
EGFRCR SERPLBLD CKD-EPI 2021: 115 ML/MIN/1.73
EOSINOPHIL # BLD AUTO: 0.1 X10E3/UL (ref 0–0.4)
EOSINOPHIL NFR BLD AUTO: 1 %
ERYTHROCYTE [DISTWIDTH] IN BLOOD BY AUTOMATED COUNT: 11.8 % (ref 11.7–15.4)
EST. AVERAGE GLUCOSE BLD GHB EST-MCNC: 111 MG/DL
FERRITIN SERPL-MCNC: 49 NG/ML (ref 15–150)
GLOBULIN SER CALC-MCNC: 2.5 G/DL (ref 1.5–4.5)
GLUCOSE SERPL-MCNC: 90 MG/DL (ref 70–99)
HBA1C MFR BLD: 5.5 % (ref 4.8–5.6)
HCG UR QL: NEGATIVE
HCT VFR BLD AUTO: 38.2 % (ref 34–46.6)
HCV IGG SERPL QL IA: NON REACTIVE
HDLC SERPL-MCNC: 67 MG/DL
HGB BLD-MCNC: 13.2 G/DL (ref 11.1–15.9)
IMM GRANULOCYTES # BLD AUTO: 0 X10E3/UL (ref 0–0.1)
IMM GRANULOCYTES NFR BLD AUTO: 0 %
IMP & REVIEW OF LAB RESULTS: NORMAL
LDLC SERPL CALC-MCNC: 95 MG/DL (ref 0–99)
LYMPHOCYTES # BLD AUTO: 2.4 X10E3/UL (ref 0.7–3.1)
LYMPHOCYTES NFR BLD AUTO: 44 %
MCH RBC QN AUTO: 29.7 PG (ref 26.6–33)
MCHC RBC AUTO-ENTMCNC: 34.6 G/DL (ref 31.5–35.7)
MCV RBC AUTO: 86 FL (ref 79–97)
MONOCYTES # BLD AUTO: 0.4 X10E3/UL (ref 0.1–0.9)
MONOCYTES NFR BLD AUTO: 8 %
NEUTROPHILS # BLD AUTO: 2.5 X10E3/UL (ref 1.4–7)
NEUTROPHILS NFR BLD AUTO: 46 %
PLATELET # BLD AUTO: 303 X10E3/UL (ref 150–450)
POTASSIUM SERPL-SCNC: 4.3 MMOL/L (ref 3.5–5.2)
PROT SERPL-MCNC: 7.1 G/DL (ref 6–8.5)
RBC # BLD AUTO: 4.44 X10E6/UL (ref 3.77–5.28)
SODIUM SERPL-SCNC: 139 MMOL/L (ref 134–144)
TRIGL SERPL-MCNC: 50 MG/DL (ref 0–149)
TSH SERPL DL<=0.005 MIU/L-ACNC: 1.77 UIU/ML (ref 0.45–4.5)
VLDLC SERPL CALC-MCNC: 10 MG/DL (ref 5–40)
WBC # BLD AUTO: 5.4 X10E3/UL (ref 3.4–10.8)

## 2023-03-30 NOTE — TELEPHONE ENCOUNTER
Pt is wondering if she needs F/U to discuss lab results. I told her that Dr. Brain Underwood would most likely leave note on MyChart to explain results, if he said they were abnormal, she can always come in for appt to discuss.

## 2023-04-03 ENCOUNTER — TELEPHONE (OUTPATIENT)
Dept: INTERNAL MEDICINE CLINIC | Age: 23
End: 2023-04-03

## 2023-04-03 RX ORDER — ERGOCALCIFEROL 1.25 MG/1
50000 CAPSULE ORAL
Qty: 12 CAPSULE | Refills: 0 | Status: SHIPPED | OUTPATIENT
Start: 2023-04-03 | End: 2023-07-02

## 2023-04-17 ENCOUNTER — OFFICE VISIT (OUTPATIENT)
Dept: BEHAVIORAL/MENTAL HEALTH CLINIC | Age: 23
End: 2023-04-17
Payer: MEDICAID

## 2023-04-17 DIAGNOSIS — F41.9 ANXIETY AND DEPRESSION: ICD-10-CM

## 2023-04-17 DIAGNOSIS — F32.A ANXIETY AND DEPRESSION: ICD-10-CM

## 2023-04-17 PROCEDURE — 99214 OFFICE O/P EST MOD 30 MIN: CPT | Performed by: NURSE PRACTITIONER

## 2023-04-17 PROCEDURE — 90833 PSYTX W PT W E/M 30 MIN: CPT | Performed by: NURSE PRACTITIONER

## 2023-04-17 RX ORDER — BUPROPION HYDROCHLORIDE 150 MG/1
150 TABLET ORAL
Qty: 30 TABLET | Refills: 1 | Status: SHIPPED | OUTPATIENT
Start: 2023-04-17

## 2023-04-17 NOTE — PROGRESS NOTES
CHIEF COMPLAINT:  Yolie Rosas is a 21 y.o. female and was seen today for follow-up of psychiatric condition and psychotropic medication management. HPI:    Yuridia Dietz reports the following psychiatric symptoms by hx:  depression and anxiety. Overall symptoms have been present for years. Currently symptoms are of low/moderate severity. The symptoms occur less frequently and are less severe. Patient reports work and family continue to bring on stress. She feels her  depression, anxiety, and irritability have improved. Pt reports medications are helping. States that her boyfriend reported noticing a difference. Side Effects: reports headache, states this tends to happen with increased stress. Patient Appetite:decreased. Sleep: good, achieving 7-8 hours. She reports cutting back on THC use. She has contacted therapist provided at last visit and will begin therapy after her vacation in May. She denies symptoms of psychosis or fernando. Denies suicidal or homicidal ideation. Current Outpatient Medications on File Prior to Visit   Medication Sig Dispense Refill    ergocalciferol (ERGOCALCIFEROL) 1,250 mcg (50,000 unit) capsule Take 1 Capsule by mouth every seven (7) days for 90 days. 12 Capsule 0    [DISCONTINUED] buPROPion XL (WELLBUTRIN XL) 150 mg tablet Take 1 Tablet by mouth every morning. 30 Tablet 0    [] dicyclomine (BENTYL) 10 mg capsule Take 1 Capsule by mouth three (3) times daily as needed for Abdominal Cramps for up to 30 days. 30 Capsule 0    Flovent HFA 44 mcg/actuation inhaler Take 2 Puffs by inhalation every four (4) hours as needed. (Patient not taking: No sig reported)       No current facility-administered medications on file prior to visit.         Past Medical History:   Diagnosis Date    Asthma         Family History   Problem Relation Age of Onset    Other Mother         \"stomach problem\"    Diabetes Maternal Grandmother     Psychiatric Disorder Paternal Grandmother         not sure of official diagnosis    Diabetes Paternal Grandfather           Social History     Socioeconomic History    Marital status: SINGLE     Spouse name: Not on file    Number of children: Not on file    Years of education: Not on file    Highest education level: Not on file   Occupational History    Not on file   Tobacco Use    Smoking status: Never    Smokeless tobacco: Never   Vaping Use    Vaping Use: Never used   Substance and Sexual Activity    Alcohol use: No    Drug use: Yes     Types: Marijuana     Comment: patient smokes marijuana 2-3 times daily    Sexual activity: Yes     Partners: Male     Birth control/protection: None   Other Topics Concern    Not on file   Social History Narrative    Not on file     Social Determinants of Health     Financial Resource Strain: Low Risk     Difficulty of Paying Living Expenses: Not hard at all   Food Insecurity: No Food Insecurity    Worried About Running Out of Food in the Last Year: Never true    Ran Out of Food in the Last Year: Never true   Transportation Needs: Not on file   Physical Activity: Not on file   Stress: Not on file   Social Connections: Not on file   Intimate Partner Violence: Not on file   Housing Stability: Not on file          REVIEW OF SYSTEMS:  Psychiatric symptoms being monitored for:  depression,anxiety  Constitutional: No report of fever, or weight gain. Eyes: No report of acute vision changes. ENT: No report of hearing changes or difficulty swallowing. Cardiac: No report of chest pain, edema or orthopnea. Respiratory: Denies dyspnea, cough or wheeze. GI: No report of abdominal pain. : No report of dysuria or hematuria. Musculoskeletal: No report of joint pain or swelling. Skin: No report of rash, lesion, or abrasions. Neurologic: No report of seizures, blackout, numbness. Endocrine: No report of polyuria or polydipsia. Hematologic: No report of blood clots or easy bleeding. Allergy: No report of hives or allergic reaction. Reproductive: No report of significant issues    There were no vitals taken for this visit. MENTAL STATUS EXAM:   Appearance: Appears stated age. Good grooming. Involuntary Movements: None  Attitude: Cooperative  Speech: normal  Language: patient able to repeat the sentence \"no ifs ands or buts. \"  Mood: \"Alright\"  Affect: congruent with mood  Thought Process: Logical   Thought Content: no delusion or paranoid thoughts. Suicidal/ Homicidal Ideations: denied  Thought Perception: no auditory or visual hallucinations  Orientation: alert, oriented to person, place and time. Recent Memory: Adequate  Remote Memory: Adequate  Attention/Concentration: able to spell word WORLD backwards. Insight/ Judgment: Good    SCALES:  3 most recent PHQ Screens 4/17/2023   Little interest or pleasure in doing things Nearly every day   Feeling down, depressed, irritable, or hopeless Not at all   Total Score PHQ 2 3   Trouble falling or staying asleep, or sleeping too much Not at all   Feeling tired or having little energy Not at all   Poor appetite, weight loss, or overeating Not at all   Feeling bad about yourself - or that you are a failure or have let yourself or your family down Several days   Trouble concentrating on things such as school, work, reading, or watching TV Not at all   Moving or speaking so slowly that other people could have noticed; or the opposite being so fidgety that others notice Not at all   Thoughts of being better off dead, or hurting yourself in some way Several days   PHQ 9 Score 5   How difficult have these problems made it for you to do your work, take care of your home and get along with others Somewhat difficult   **Patient asked about thoughts of death and what has kept her from not harming himself. She states that she would never want to hurt her love ones. She denies intent or plan. Reports that she has been able to distract herself by doing things  she enjoys.  Discussed if she ever feels unsafe to call [phone] or go to the nearest emergency room. CRISTI 2/7 3/29/2023 3/26/2023   Feeling nervous, anxious, or on edge 3 3   Not being able to stop or control worrying 3 3   Worrying too much about different things 3 3   Trouble relaxing 3 1   Being so restless that it is hard to sit still 3 1   Becoming easily annoyed or irritable 3 3   Feeling afraid as if something awful might happen 3 1   CRISTI-7 Total Score 21 15     PSYCHOTHERAPY:  Duration spent in psychotherapy: 18 minutes  Issues discussed: Relationship stressors (family), coping skills   Interventions: CBT, stress reduction/mindfulness   Patient response: Patient was engaged in discussion, and demonstrated insight. She was able to offer examples of ways to practice skills discussed. MEDICAL DECISION MAKING:  Problems addressed today:    ICD-10-CM ICD-9-CM    1. Anxiety and depression  F41.9 300.00 buPROPion XL (WELLBUTRIN XL) 150 mg tablet    F32. A 311           Assessment:   Clay Hudson is responding to treatment. Symptoms are improved. Discussed current medications and dosages. Agreed to continue Bupropion XL at current dose. Patient will be seen or communicate within a few weeks their progress or any continued issues with headaches. Risks, benefits, and side effects of medications were reviewed and discussed in detail. Patient agreed that the potential benefit from a medication trial outweighs the acknowledged risks. Reviewed treatment goals and target symptoms to monitor for. Plan:  1. MEDICATION:   1. Anxiety and depression  -     buPROPion XL (WELLBUTRIN XL) 150 mg tablet; Take 1 Tablet by mouth every morning., Normal, Disp-30 Tablet, R-1     2. Counseling and coordination of care including instructions for treatment, risks/benefits, risk factor reduction and patient/family education. She agrees with the plan. Patient instructed to call with any side effects, questions or issues.    3.PSYCHOTHERAPY: Patient was provided with supportive therapy, strongly encourage to seek psychotherapy. 4. MEDICAL CARE: Patient was strongly encourage to take their medical medications and follow up with their PCP on regular basis. 5. SUBSTANCE ABUSE CARE: Discussed the need to abstain from the use of substances and alcohol with medications. 6. Patient was informed that in case of emergency to go to the nearest ER or call 911    Follow-up and Dispositions    Return in about 4 weeks (around 5/15/2023).          No LOS data to display       4/17/2023  Marline Bergman NP

## 2023-05-24 ENCOUNTER — TELEMEDICINE (OUTPATIENT)
Age: 23
End: 2023-05-24
Payer: MEDICAID

## 2023-05-24 DIAGNOSIS — F33.1 DEPRESSION, MAJOR, RECURRENT, MODERATE (HCC): Primary | ICD-10-CM

## 2023-05-24 DIAGNOSIS — F41.9 ANXIETY: ICD-10-CM

## 2023-05-24 PROCEDURE — 99214 OFFICE O/P EST MOD 30 MIN: CPT | Performed by: NURSE PRACTITIONER

## 2023-05-24 NOTE — PROGRESS NOTES
Jose Angel Mcghee, was evaluated through a synchronous (real-time) audio-video encounter. The patient (or guardian if applicable) is aware that this is a billable service, which includes applicable co-pays. This Virtual Visit was conducted with patient's (and/or legal guardian's) consent. Patient identification was verified, and a caregiver was present when appropriate. The patient was located at Home: [address] [place] [address]  Provider was located at [facility] ([address] [place] [address] Dept): [address]  [place],  [address] [facility]         CHIEF COMPLAINT:  Jose Angel Mcghee is a 21 y.o. female and was seen today for follow-up of psychiatric condition and psychotropic medication management. HPI:    Christie Dos Santos reports the following psychiatric symptoms by hx:  depression, anxiety, ADHD. Overall symptoms have been present for years. Currently symptoms are of low/moderate severity. The symptoms occur infrequently. Patient identifies stress with family and recent breakup. States that her boyfriend had been unfaithful. Reports that her depression has been somewhat worse with current stress. She finds talking with others has helped with managing her depression. Anxiety is about the same. She stopped taking her medication. She reports concern with medication interacting with alcohol. States that she has been going out more, consuming 1-2 drinks a setting. Denies abuse of alcohol. Denies any use of marijuana. Her appetite and sleep are fair. She has been working. Reports continued issues with focus and staying on track. States that she has an appointment scheduled with a therapist on 5/31. She denies symptoms of psychosis or gabriela. Denies suicidal or homicidal ideation.       Current Outpatient Medications on File Prior to Visit   Medication Sig Dispense Refill    buPROPion (WELLBUTRIN XL) 150 MG extended release tablet Take 150 mg by mouth      fluticasone (FLOVENT HFA) 44 MCG/ACT inhaler Inhale 2

## 2024-03-06 ENCOUNTER — TELEMEDICINE (OUTPATIENT)
Age: 24
End: 2024-03-06
Payer: MEDICAID

## 2024-03-06 DIAGNOSIS — F41.9 ANXIETY AND DEPRESSION: Primary | ICD-10-CM

## 2024-03-06 DIAGNOSIS — F32.A ANXIETY AND DEPRESSION: Primary | ICD-10-CM

## 2024-03-06 PROCEDURE — 99213 OFFICE O/P EST LOW 20 MIN: CPT | Performed by: INTERNAL MEDICINE

## 2024-03-06 RX ORDER — BUPROPION HYDROCHLORIDE 150 MG/1
150 TABLET ORAL EVERY MORNING
Qty: 30 TABLET | Refills: 3 | Status: SHIPPED | OUTPATIENT
Start: 2024-03-06

## 2024-03-06 SDOH — ECONOMIC STABILITY: INCOME INSECURITY: HOW HARD IS IT FOR YOU TO PAY FOR THE VERY BASICS LIKE FOOD, HOUSING, MEDICAL CARE, AND HEATING?: VERY HARD

## 2024-03-06 SDOH — ECONOMIC STABILITY: FOOD INSECURITY: WITHIN THE PAST 12 MONTHS, THE FOOD YOU BOUGHT JUST DIDN'T LAST AND YOU DIDN'T HAVE MONEY TO GET MORE.: SOMETIMES TRUE

## 2024-03-06 SDOH — ECONOMIC STABILITY: HOUSING INSECURITY
IN THE LAST 12 MONTHS, WAS THERE A TIME WHEN YOU DID NOT HAVE A STEADY PLACE TO SLEEP OR SLEPT IN A SHELTER (INCLUDING NOW)?: NO

## 2024-03-06 SDOH — ECONOMIC STABILITY: FOOD INSECURITY: WITHIN THE PAST 12 MONTHS, YOU WORRIED THAT YOUR FOOD WOULD RUN OUT BEFORE YOU GOT MONEY TO BUY MORE.: OFTEN TRUE

## 2024-03-06 ASSESSMENT — PATIENT HEALTH QUESTIONNAIRE - PHQ9
4. FEELING TIRED OR HAVING LITTLE ENERGY: 3
9. THOUGHTS THAT YOU WOULD BE BETTER OFF DEAD, OR OF HURTING YOURSELF: 0
6. FEELING BAD ABOUT YOURSELF - OR THAT YOU ARE A FAILURE OR HAVE LET YOURSELF OR YOUR FAMILY DOWN: 1
5. POOR APPETITE OR OVEREATING: 1
SUM OF ALL RESPONSES TO PHQ QUESTIONS 1-9: 12
2. FEELING DOWN, DEPRESSED OR HOPELESS: 1
SUM OF ALL RESPONSES TO PHQ QUESTIONS 1-9: 12
SUM OF ALL RESPONSES TO PHQ QUESTIONS 1-9: 12
7. TROUBLE CONCENTRATING ON THINGS, SUCH AS READING THE NEWSPAPER OR WATCHING TELEVISION: 1
10. IF YOU CHECKED OFF ANY PROBLEMS, HOW DIFFICULT HAVE THESE PROBLEMS MADE IT FOR YOU TO DO YOUR WORK, TAKE CARE OF THINGS AT HOME, OR GET ALONG WITH OTHER PEOPLE: 1
SUM OF ALL RESPONSES TO PHQ QUESTIONS 1-9: 12
3. TROUBLE FALLING OR STAYING ASLEEP: 3
8. MOVING OR SPEAKING SO SLOWLY THAT OTHER PEOPLE COULD HAVE NOTICED. OR THE OPPOSITE, BEING SO FIGETY OR RESTLESS THAT YOU HAVE BEEN MOVING AROUND A LOT MORE THAN USUAL: 1
SUM OF ALL RESPONSES TO PHQ9 QUESTIONS 1 & 2: 2
1. LITTLE INTEREST OR PLEASURE IN DOING THINGS: 1

## 2024-03-06 ASSESSMENT — ENCOUNTER SYMPTOMS
COUGH: 0
SHORTNESS OF BREATH: 0

## 2024-03-06 NOTE — PROGRESS NOTES
I have reviewed all needed documentation in preparation for visit. Verified patient by name and date of birth.  Pt reports they are physically in the [facility].  Virtual link sent to pt via verified text/email    OR    Pt elected to connect through [email or web address]    Chief Complaint   Patient presents with    Other     Mental Health Visit       \"Have you been to the ER, urgent care clinic since your last visit?  Hospitalized since your last visit?\"    NO    “Have you seen or consulted any other health care providers outside of [facility] since your last visit?”    NO             There were no vitals filed for this visit.    Health Maintenance Due   Topic Date Due    Hepatitis B vaccine (1 of 3 - 3-dose series) Never done    COVID-19 Vaccine (1) Never done    Varicella vaccine (1 of 2 - 2-dose childhood series) Never done    HPV vaccine (1 - 2-dose series) Never done    HIV screen  Never done    Chlamydia/GC screen  Never done    DTaP/Tdap/Td vaccine (1 - Tdap) Never done    Pap smear  Never done    Flu vaccine (1) Never done       Genny Adams LPN

## 2024-03-06 NOTE — PROGRESS NOTES
Tico Dukes is a 24 y.o. female who was seen by synchronous (real-time) audio-video technology on 3/6/2024    Consent: Tico Dukes, who was seen by synchronous (real-time) audio-video technology, and/or her healthcare decision maker, is aware that this patient-initiated, Telehealth encounter on 3/6/2024 is a billable service, with coverage as determined by her insurance carrier. She is aware that she may receive a bill and has provided verbal consent to proceed: YES  Subjective:   Tico Dukes is a 24 y.o. female who was seen for Other (Mental Health Visit)  Ms.Mekai Dukes presents to clinic mainly because of anxiety episodes 2 days ago. She was in a grocery store when she felt anxious, palpitation, shortness of breath and chest pain and attributes the symptoms to anxiety due to regular life stress. After the episodes, she was generally feeling anxious. She went to [facility] and diagnosed with ADHD/anxiety/Bipolar disorder/PTSD and was recommended to continue bupropion but she has not started it yet, she wants to see if that can be prescribed. Order was sent to her pharmacy. She has no SI/HI. She feels depressed with PHQ 9 score of 12. She tried a psychotherapy but she didn't feel it was helpful.       Health Maintenance Due   Topic Date Due    Hepatitis B vaccine (1 of 3 - 3-dose series) Never done    COVID-19 Vaccine (1) Never done    Varicella vaccine (1 of 2 - 2-dose childhood series) Never done    HPV vaccine (1 - 2-dose series) Never done    HIV screen  Never done    Chlamydia/GC screen  Never done    DTaP/Tdap/Td vaccine (1 - Tdap) Never done    Pap smear  Never done    Flu vaccine (1) Never done       Review of Systems   Constitutional:  Negative for fever.   Respiratory:  Negative for cough and shortness of breath.    Cardiovascular:  Negative for chest pain and palpitations.   Neurological:  Negative for headaches.   Psychiatric/Behavioral:  Negative for dysphoric mood.           Objective:

## 2024-03-06 NOTE — PROGRESS NOTES
Tico Dukes is a 24 y.o. female  Chief Complaint   Patient presents with    Other     Mental Health Visit     There were no vitals filed for this visit.       HPI  Ms.Mekai Dukes presents to clinic mainly because of anxiety episodes 2 days ago. She has no acute concerns.     .PHQ-9 Total Score: 12 (3/6/2024  9:44 AM)  Thoughts that you would be better off dead, or of hurting yourself in some way: 0 (3/6/2024  9:44 AM)        .  Past Medical History:   Diagnosis Date    Asthma             ROS  Review of Systems        EXAM  Physical Exam    Health Maintenance Due   Topic Date Due    Hepatitis B vaccine (1 of 3 - 3-dose series) Never done    COVID-19 Vaccine (1) Never done    Varicella vaccine (1 of 2 - 2-dose childhood series) Never done    HPV vaccine (1 - 2-dose series) Never done    HIV screen  Never done    Chlamydia/GC screen  Never done    DTaP/Tdap/Td vaccine (1 - Tdap) Never done    Pap smear  Never done    Flu vaccine (1) Never done       ASSESSMENT/PLAN    There are no diagnoses linked to this encounter.      Cyndy Johnson MD

## 2024-04-17 ENCOUNTER — OFFICE VISIT (OUTPATIENT)
Age: 24
End: 2024-04-17
Payer: MEDICAID

## 2024-04-17 VITALS
TEMPERATURE: 97.7 F | RESPIRATION RATE: 16 BRPM | WEIGHT: 155 LBS | HEIGHT: 63 IN | DIASTOLIC BLOOD PRESSURE: 72 MMHG | BODY MASS INDEX: 27.46 KG/M2 | HEART RATE: 75 BPM | OXYGEN SATURATION: 100 % | SYSTOLIC BLOOD PRESSURE: 112 MMHG

## 2024-04-17 DIAGNOSIS — F41.1 GENERALIZED ANXIETY DISORDER: ICD-10-CM

## 2024-04-17 DIAGNOSIS — F90.0 ATTENTION-DEFICIT HYPERACTIVITY DISORDER, PREDOMINANTLY INATTENTIVE TYPE: Primary | ICD-10-CM

## 2024-04-17 PROCEDURE — 99214 OFFICE O/P EST MOD 30 MIN: CPT | Performed by: NURSE PRACTITIONER

## 2024-04-17 PROCEDURE — 90833 PSYTX W PT W E/M 30 MIN: CPT | Performed by: NURSE PRACTITIONER

## 2024-04-17 RX ORDER — DEXMETHYLPHENIDATE HYDROCHLORIDE 5 MG/1
5 TABLET ORAL EVERY MORNING
Qty: 30 TABLET | Refills: 0 | Status: SHIPPED | OUTPATIENT
Start: 2024-04-17 | End: 2024-05-17

## 2024-04-17 ASSESSMENT — PATIENT HEALTH QUESTIONNAIRE - PHQ9
SUM OF ALL RESPONSES TO PHQ QUESTIONS 1-9: 16
2. FEELING DOWN, DEPRESSED OR HOPELESS: MORE THAN HALF THE DAYS
SUM OF ALL RESPONSES TO PHQ QUESTIONS 1-9: 16
4. FEELING TIRED OR HAVING LITTLE ENERGY: NEARLY EVERY DAY
SUM OF ALL RESPONSES TO PHQ9 QUESTIONS 1 & 2: 4
3. TROUBLE FALLING OR STAYING ASLEEP: NEARLY EVERY DAY
SUM OF ALL RESPONSES TO PHQ QUESTIONS 1-9: 16
1. LITTLE INTEREST OR PLEASURE IN DOING THINGS: MORE THAN HALF THE DAYS
8. MOVING OR SPEAKING SO SLOWLY THAT OTHER PEOPLE COULD HAVE NOTICED. OR THE OPPOSITE, BEING SO FIGETY OR RESTLESS THAT YOU HAVE BEEN MOVING AROUND A LOT MORE THAN USUAL: NOT AT ALL
10. IF YOU CHECKED OFF ANY PROBLEMS, HOW DIFFICULT HAVE THESE PROBLEMS MADE IT FOR YOU TO DO YOUR WORK, TAKE CARE OF THINGS AT HOME, OR GET ALONG WITH OTHER PEOPLE: SOMEWHAT DIFFICULT
SUM OF ALL RESPONSES TO PHQ QUESTIONS 1-9: 16
9. THOUGHTS THAT YOU WOULD BE BETTER OFF DEAD, OR OF HURTING YOURSELF: NOT AT ALL
5. POOR APPETITE OR OVEREATING: NEARLY EVERY DAY
6. FEELING BAD ABOUT YOURSELF - OR THAT YOU ARE A FAILURE OR HAVE LET YOURSELF OR YOUR FAMILY DOWN: MORE THAN HALF THE DAYS
7. TROUBLE CONCENTRATING ON THINGS, SUCH AS READING THE NEWSPAPER OR WATCHING TELEVISION: SEVERAL DAYS

## 2024-04-17 ASSESSMENT — ANXIETY QUESTIONNAIRES
7. FEELING AFRAID AS IF SOMETHING AWFUL MIGHT HAPPEN: NEARLY EVERY DAY
IF YOU CHECKED OFF ANY PROBLEMS ON THIS QUESTIONNAIRE, HOW DIFFICULT HAVE THESE PROBLEMS MADE IT FOR YOU TO DO YOUR WORK, TAKE CARE OF THINGS AT HOME, OR GET ALONG WITH OTHER PEOPLE: SOMEWHAT DIFFICULT
GAD7 TOTAL SCORE: 21
3. WORRYING TOO MUCH ABOUT DIFFERENT THINGS: NEARLY EVERY DAY
4. TROUBLE RELAXING: NEARLY EVERY DAY
1. FEELING NERVOUS, ANXIOUS, OR ON EDGE: NEARLY EVERY DAY
2. NOT BEING ABLE TO STOP OR CONTROL WORRYING: NEARLY EVERY DAY
5. BEING SO RESTLESS THAT IT IS HARD TO SIT STILL: NEARLY EVERY DAY
6. BECOMING EASILY ANNOYED OR IRRITABLE: NEARLY EVERY DAY

## 2024-04-17 NOTE — PROGRESS NOTES
CHIEF COMPLAINT:  Tico Dukes is a 24 y.o. female and was seen today for follow-up of psychiatric condition and psychotropic medication management.     HPI:    Tico reports the following psychiatric symptoms by hx: depression, anxiety, ADHD. Overall symptoms have been present for years. Currently symptoms are of moderate severity. The symptoms occur daily. Patient identifies stress work and life stress. Her depression is better. Anxiety is somewhat worse. Patient reports having anxiety attack while in the local [employer]. Energy is fair. Concentration is impaired and has made it difficult for her to follow through and keep up at work. Reports getting distracted. Mood is irritable at times. Patient states PCP has been prescribing Bupropion. Denies any side effects or concerns. She does feel the medication has helped her depression. She reports that she has attempted therapy but did not find it to be of benefit. Reports infrequent use of marijuana. Her appetite and sleep are fair. She denies symptoms of psychosis or gabriela. Denies suicidal or homicidal ideation.     No current outpatient medications on file prior to visit.     No current facility-administered medications on file prior to visit.        Past Medical History:   Diagnosis Date    Asthma         Family History   Problem Relation Age of Onset    Other Mother         \"stomach problem\"    Diabetes Maternal Grandmother     Psychiatric Disorder Paternal Grandmother         not sure of official diagnosis    Diabetes Paternal Grandfather           Social History     Socioeconomic History    Marital status: Single     Spouse name: Not on file    Number of children: Not on file    Years of education: Not on file    Highest education level: Not on file   Occupational History    Not on file   Tobacco Use    Smoking status: Never    Smokeless tobacco: Never   Substance and Sexual Activity    Alcohol use: No    Drug use: Yes     Types: Marijuana (Weed)    Sexual

## 2024-04-17 NOTE — PROGRESS NOTES
Chief Complaint   Patient presents with    Medication Check      Vitals:    04/17/24 1401   BP: 112/72   Pulse: 75   Resp: 16   Temp: 97.7 °F (36.5 °C)   SpO2: 100%      Prior to Admission medications    Medication Sig Start Date End Date Taking? Authorizing Provider   buPROPion (WELLBUTRIN XL) 150 MG extended release tablet Take 1 tablet by mouth every morning 3/6/24  Yes Cyndy Johnson MD      PHQ-9 Total Score: 16 (4/17/2024  2:00 PM)  Thoughts that you would be better off dead, or of hurting yourself in some way: 0 (4/17/2024  2:00 PM)         4/17/2024     2:00 PM 3/29/2023    10:00 AM 3/26/2023     8:47 AM   JOSE-7 SCREENING   Feeling nervous, anxious, or on edge Nearly every day Nearly every day Nearly every day   Not being able to stop or control worrying Nearly every day Nearly every day Nearly every day   Worrying too much about different things Nearly every day Nearly every day Nearly every day   Trouble relaxing Nearly every day Nearly every day Several days   Being so restless that it is hard to sit still Nearly every day Nearly every day Several days   Becoming easily annoyed or irritable Nearly every day Nearly every day Nearly every day   Feeling afraid as if something awful might happen Nearly every day Nearly every day Several days   JOSE-7 Total Score 21 21 15   How difficult have these problems made it for you to do your work, take care of things at home, or get along with other people? Somewhat difficult Very difficult Very difficult        \"Have you been to the ER, urgent care clinic since your last visit?  Hospitalized since your last visit?\"    NO    “Have you seen or consulted any other health care providers outside of [facility] since your last visit?”    NO

## 2024-04-22 ENCOUNTER — TELEPHONE (OUTPATIENT)
Age: 24
End: 2024-04-22

## 2024-04-22 NOTE — TELEPHONE ENCOUNTER
Patient notified Dexmethylphenidate (FOCALIN) 5 MG tablet prior authorization has been approved. Approval letter scanned in Media.

## 2024-05-30 ENCOUNTER — TELEMEDICINE (OUTPATIENT)
Age: 24
End: 2024-05-30

## 2024-05-30 DIAGNOSIS — F90.0 ATTENTION-DEFICIT HYPERACTIVITY DISORDER, PREDOMINANTLY INATTENTIVE TYPE: Primary | ICD-10-CM

## 2024-05-30 RX ORDER — LISDEXAMFETAMINE DIMESYLATE CAPSULES 10 MG/1
10 CAPSULE ORAL EVERY MORNING
Qty: 30 CAPSULE | Refills: 0 | Status: SHIPPED | OUTPATIENT
Start: 2024-05-30 | End: 2024-06-29

## 2024-05-30 NOTE — PROGRESS NOTES
Tico Dukes, was evaluated through a synchronous (real-time) audio-video encounter. The patient (or guardian if applicable) is aware that this is a billable service, which includes applicable co-pays. This Virtual Visit was conducted with patient's (and/or legal guardian's) consent. Patient identification was verified, and a caregiver was present when appropriate.     The patient was located at Home: [address]  [facility] [address]  Provider was located at Facility (Appt Dept): [address] [facility]  [address]  [place] [address]    Confirm you are appropriately licensed, registered, or certified to deliver care in the state where the patient is located as indicated above. If you are not or unsure, please re-schedule the visit: Yes, I confirm.     CHIEF COMPLAINT:  Tico Dukes is a 24 y.o. female and was seen today for follow-up of psychiatric condition and psychotropic medication management.     HPI:    Tico reports the following psychiatric symptoms by hx:  depression, anxiety, and inattentiveness.  Overall symptoms have been present for years. Currently symptoms are of moderate severity. The symptoms occur intermittently. Patient identifies work stress. She feels her depression and anxiety are somewhat manageable. Her ability to concentrate and follow through with task at work and home is unchanged. She reports attempts to be compliant with medication. She reports \"not likinh how it made me feel, headaches and dizziness.\" Patient Appetite:no change from normal. Sleep: no change. Patient denies symptoms of psychosis or gabriela. Denies suicidal or homicidal ideation.     No current outpatient medications on file prior to visit.     No current facility-administered medications on file prior to visit.        Past Medical History:   Diagnosis Date    Asthma         Family History   Problem Relation Age of Onset    Other Mother         \"stomach problem\"    Diabetes Maternal Grandmother

## 2024-06-19 ENCOUNTER — TELEPHONE (OUTPATIENT)
Age: 24
End: 2024-06-19

## 2024-06-19 NOTE — TELEPHONE ENCOUNTER
Patient LVM stating she was told her meds where approved , pharmacy it telling patient meds are not approved,  patient did not mention which meds, patient is asking for a call back

## 2024-07-03 ENCOUNTER — TELEMEDICINE (OUTPATIENT)
Age: 24
End: 2024-07-03
Payer: MEDICAID

## 2024-07-03 DIAGNOSIS — F90.0 ATTENTION-DEFICIT HYPERACTIVITY DISORDER, PREDOMINANTLY INATTENTIVE TYPE: Primary | ICD-10-CM

## 2024-07-03 PROCEDURE — 99214 OFFICE O/P EST MOD 30 MIN: CPT | Performed by: NURSE PRACTITIONER

## 2024-07-03 NOTE — PROGRESS NOTES
Tico Dukes, was evaluated through a synchronous (real-time) audio-video encounter. The patient (or guardian if applicable) is aware that this is a billable service, which includes applicable co-pays. This Virtual Visit was conducted with patient's (and/or legal guardian's) consent. Patient identification was verified, and a caregiver was present when appropriate.     The patient was located at Home: [address]  [facility] [address]  Provider was located at Facility (Appt Dept): [address] [facility]  [address]  [place] [address]  Confirm you are appropriately licensed, registered, or certified to deliver care in the state where the patient is located as indicated above. If you are not or unsure, please re-schedule the visit: Yes, I confirm.     CHIEF COMPLAINT:  Tico Dukes is a 24 y.o. female and was seen today for follow-up of psychiatric condition and psychotropic medication management.     HPI:    Tico reports the following psychiatric symptoms by hx:  depression, anxiety, and inattentiveness.  Overall symptoms have been present for years. Currently symptoms are of moderate severity. The symptoms occur intermittently. Patient identifies life and personal stress. Her depression and anxiety have been manageable. She states that she has decided to join the . She is currently studying to past the entry exam. Patient states that she has been told that she can not be on any medications including the Vyvanse. 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 She feels family has been a big part of her stress. She has stopped taking the Vyvanse. She denies any side effects or concerns. She admits to having some issues focusing especially while studying for this exam but is working harder on being organized. Patient Appetite:no change from normal. Sleep: no change. Patient denies symptoms of 
refused psychotherapy at this time.  4. MEDICAL CARE: Patient was strongly encourage to take their medical medications and follow up with their PCP on regular basis.    5. SUBSTANCE ABUSE CARE: Patient denies a smoking, drinking, abusing any illicit drugs.  6. Patient was informed that in case of emergency to go to the nearest ER or call 911.     Patient was given time to ask questions and voice concerns. I believe all questions concerns were adequately addressed at this office visit. Patient verbalized agreement and understanding of the above-stated plan.        No LOS data to display       7/3/2024  CONSTANZA Bridges - NP

## 2024-07-29 ENCOUNTER — TELEPHONE (OUTPATIENT)
Age: 24
End: 2024-07-29

## 2024-07-29 NOTE — TELEPHONE ENCOUNTER
Patient requesting call back ASAP. Patient states she needs to discuss an urgent matter related to  recruitment. Patient states its time sensitive matter and no additional information was given. Patient can be reached [phone].

## 2024-07-31 ENCOUNTER — OFFICE VISIT (OUTPATIENT)
Age: 24
End: 2024-07-31
Payer: MEDICAID

## 2024-07-31 VITALS
WEIGHT: 154 LBS | TEMPERATURE: 97.6 F | HEART RATE: 80 BPM | BODY MASS INDEX: 27.29 KG/M2 | DIASTOLIC BLOOD PRESSURE: 76 MMHG | RESPIRATION RATE: 16 BRPM | SYSTOLIC BLOOD PRESSURE: 120 MMHG | OXYGEN SATURATION: 98 % | HEIGHT: 63 IN

## 2024-07-31 DIAGNOSIS — D72.819 LEUKOPENIA, UNSPECIFIED TYPE: ICD-10-CM

## 2024-07-31 DIAGNOSIS — D72.819 LEUKOPENIA, UNSPECIFIED TYPE: Primary | ICD-10-CM

## 2024-07-31 PROCEDURE — 99213 OFFICE O/P EST LOW 20 MIN: CPT | Performed by: INTERNAL MEDICINE

## 2024-07-31 NOTE — PROGRESS NOTES
I have reviewed all needed documentation in preparation for visit. Verified patient by name and date of birth  No chief complaint on file.      There were no vitals filed for this visit.    Health Maintenance Due   Topic Date Due    Hepatitis B vaccine (1 of 3 - 3-dose series) Never done    COVID-19 Vaccine (1) Never done    Varicella vaccine (1 of 2 - 2-dose childhood series) Never done    HPV vaccine (1 - 2-dose series) Never done    HIV screen  Never done    Chlamydia/GC screen  Never done    DTaP/Tdap/Td vaccine (1 - Tdap) Never done    Pap smear  Never done     \"Have you been to the ER, urgent care clinic since your last visit?  Hospitalized since your last visit?\"    NO    “Have you seen or consulted any other health care providers outside of [facility] since your last visit?”    No      “Have you had a pap smear?”    NO    No cervical cancer screening on file             Click Here for Release of Records Request         [place] [facility]

## 2024-07-31 NOTE — PROGRESS NOTES
Tico Dukes is a 24 y.o. female  Chief Complaint   Patient presents with    3 Month Follow-Up     Pt is going to [employer] , need Lab work up and regular check up        Vitals:    07/31/24 1412   BP: 120/76   Pulse: 80   Resp: 16   Temp: 97.6 °F (36.4 °C)   SpO2: 98%          HPI  Ms.Mekai Dukes is a 24 y.o. who is going to [employer] and she was told that her white blood cells and neutrophil were low and wants to get that evaluated. Will repeat test today, reassured patient and will consider further work up if needed or if persistent         .  Past Medical History:   Diagnosis Date    Asthma             ROS  Review of Systems   Constitutional:  Negative for fever and unexpected weight change.   HENT:  Negative for congestion, sinus pressure, sinus pain and sore throat.    Eyes:  Negative for pain and visual disturbance.   Respiratory:  Negative for cough and shortness of breath.    Cardiovascular:  Negative for chest pain, palpitations and leg swelling.   Gastrointestinal:  Negative for abdominal pain, blood in stool, constipation, diarrhea, nausea and vomiting.   Endocrine: Negative for polydipsia.   Genitourinary:  Negative for difficulty urinating, dysuria, frequency, hematuria and urgency.   Musculoskeletal:  Negative for back pain, gait problem and neck pain.   Skin:  Negative for rash.   Allergic/Immunologic: Negative for immunocompromised state.   Neurological:  Negative for dizziness, seizures, syncope and headaches.   Hematological:  Negative for adenopathy.   Psychiatric/Behavioral:  Negative for behavioral problems, dysphoric mood, sleep disturbance and suicidal ideas. The patient is not nervous/anxious.            EXAM  Physical Exam  Vitals and nursing note reviewed.   Constitutional:       General: She is not in acute distress.     Appearance: Normal appearance. She is not ill-appearing.   HENT:      Head: Normocephalic and atraumatic.      Right Ear: Tympanic membrane, ear canal and external ear normal.

## 2024-08-01 LAB
ALBUMIN SERPL-MCNC: 4.5 G/DL (ref 4–5)
ALP SERPL-CCNC: 64 IU/L (ref 44–121)
ALT SERPL-CCNC: 30 IU/L (ref 0–32)
APPEARANCE UR: CLEAR
AST SERPL-CCNC: 79 IU/L (ref 0–40)
BASOPHILS # BLD AUTO: 0 X10E3/UL (ref 0–0.2)
BASOPHILS NFR BLD AUTO: 1 %
BILIRUB DIRECT SERPL-MCNC: 0.11 MG/DL (ref 0–0.4)
BILIRUB SERPL-MCNC: 0.4 MG/DL (ref 0–1.2)
BILIRUB UR QL STRIP: NEGATIVE
EOSINOPHIL # BLD AUTO: 0 X10E3/UL (ref 0–0.4)
EOSINOPHIL NFR BLD AUTO: 1 %
ERYTHROCYTE [DISTWIDTH] IN BLOOD BY AUTOMATED COUNT: 12.2 % (ref 11.7–15.4)
ERYTHROCYTE [SEDIMENTATION RATE] IN BLOOD BY WESTERGREN METHOD: 6 MM/HR (ref 0–32)
GLUCOSE UR QL STRIP: NEGATIVE
HAV IGM SERPL QL IA: NEGATIVE
HBV CORE IGM SERPL QL IA: NEGATIVE
HBV SURFACE AG SERPL QL IA: NEGATIVE
HCT VFR BLD AUTO: 37.6 % (ref 34–46.6)
HCV AB SERPL QL IA: NORMAL
HCV IGG SERPL QL IA: NON REACTIVE
HGB BLD-MCNC: 12.6 G/DL (ref 11.1–15.9)
HGB UR QL STRIP: NEGATIVE
HIV 1+2 AB+HIV1 P24 AG SERPL QL IA: NON REACTIVE
HSV2 IGG SER IA-ACNC: <0.91 INDEX (ref 0–0.9)
IMM GRANULOCYTES # BLD AUTO: 0 X10E3/UL (ref 0–0.1)
IMM GRANULOCYTES NFR BLD AUTO: 0 %
KETONES UR QL STRIP: NEGATIVE
LEUKOCYTE ESTERASE UR QL STRIP: NEGATIVE
LYMPHOCYTES # BLD AUTO: 1.7 X10E3/UL (ref 0.7–3.1)
LYMPHOCYTES NFR BLD AUTO: 32 %
MCH RBC QN AUTO: 28.4 PG (ref 26.6–33)
MCHC RBC AUTO-ENTMCNC: 33.5 G/DL (ref 31.5–35.7)
MCV RBC AUTO: 85 FL (ref 79–97)
MONOCYTES # BLD AUTO: 0.5 X10E3/UL (ref 0.1–0.9)
MONOCYTES NFR BLD AUTO: 9 %
NEUTROPHILS # BLD AUTO: 3.2 X10E3/UL (ref 1.4–7)
NEUTROPHILS NFR BLD AUTO: 57 %
NITRITE UR QL STRIP: NEGATIVE
PH UR STRIP: 7 [PH] (ref 5–7.5)
PLATELET # BLD AUTO: 344 X10E3/UL (ref 150–450)
PROT SERPL-MCNC: 7.4 G/DL (ref 6–8.5)
PROT UR QL STRIP: NEGATIVE
RBC # BLD AUTO: 4.43 X10E6/UL (ref 3.77–5.28)
SP GR UR STRIP: <=1.005 (ref 1–1.03)
TREPONEMA PALLIDUM IGG+IGM AB [PRESENCE] IN SERUM OR PLASMA BY IMMUNOASSAY: NON REACTIVE
UROBILINOGEN UR STRIP-MCNC: 0.2 MG/DL (ref 0.2–1)
WBC # BLD AUTO: 5.5 X10E3/UL (ref 3.4–10.8)

## 2024-08-02 ENCOUNTER — TELEPHONE (OUTPATIENT)
Age: 24
End: 2024-08-02

## 2024-08-02 ASSESSMENT — ENCOUNTER SYMPTOMS
SHORTNESS OF BREATH: 0
DIARRHEA: 0
SORE THROAT: 0
BLOOD IN STOOL: 0
NAUSEA: 0
VOMITING: 0
EYE PAIN: 0
SINUS PRESSURE: 0
SINUS PAIN: 0
COUGH: 0
CONSTIPATION: 0
ABDOMINAL PAIN: 0
BACK PAIN: 0

## 2024-08-02 NOTE — TELEPHONE ENCOUNTER
----- Message from Rob Lewisllano sent at 8/1/2024  3:03 PM EDT -----  Regarding: ECC Message to Provider  ECC Message to Provider    Relationship to Patient: Self     Additional Information patient called in asking a hard copy of the results since she mentioned its hard for her to download 1 by 1 in my chart . Pt is also asking if she needs to have a follow.up appointment  in regards to the result.  --------------------------------------------------------------------------------------------------------------------------    Call Back Information: OK to leave message on voicemail  Preferred Call Back Number: Phone [phone]

## 2024-08-05 LAB
C TRACH RRNA SPEC QL NAA+PROBE: NEGATIVE
N GONORRHOEA RRNA SPEC QL NAA+PROBE: NEGATIVE
T VAGINALIS RRNA SPEC QL NAA+PROBE: NEGATIVE

## 2024-08-15 ENCOUNTER — TELEPHONE (OUTPATIENT)
Age: 24
End: 2024-08-15